# Patient Record
Sex: MALE | Race: WHITE | Employment: OTHER | URBAN - METROPOLITAN AREA
[De-identification: names, ages, dates, MRNs, and addresses within clinical notes are randomized per-mention and may not be internally consistent; named-entity substitution may affect disease eponyms.]

---

## 2017-02-24 ENCOUNTER — HOSPITAL ENCOUNTER (OUTPATIENT)
Age: 62
Setting detail: OUTPATIENT SURGERY
Discharge: HOME | End: 2017-02-24
Attending: ORTHOPAEDIC SURGERY | Admitting: ORTHOPAEDIC SURGERY
Payer: COMMERCIAL

## 2017-02-24 ENCOUNTER — ANESTHESIA EVENT (OUTPATIENT)
Dept: SURGERY | Age: 62
End: 2017-02-24
Payer: COMMERCIAL

## 2017-02-24 ENCOUNTER — ANESTHESIA (OUTPATIENT)
Dept: SURGERY | Age: 62
End: 2017-02-24
Payer: COMMERCIAL

## 2017-02-24 ENCOUNTER — APPOINTMENT (OUTPATIENT)
Dept: GENERAL RADIOLOGY | Age: 62
End: 2017-02-24
Attending: ORTHOPAEDIC SURGERY
Payer: COMMERCIAL

## 2017-02-24 VITALS
HEART RATE: 80 BPM | TEMPERATURE: 97.6 F | BODY MASS INDEX: 20.47 KG/M2 | RESPIRATION RATE: 12 BRPM | WEIGHT: 143 LBS | HEIGHT: 70 IN | SYSTOLIC BLOOD PRESSURE: 130 MMHG | OXYGEN SATURATION: 100 % | DIASTOLIC BLOOD PRESSURE: 80 MMHG

## 2017-02-24 PROBLEM — S82.851A CLOSED TRIMALLEOLAR FRACTURE OF RIGHT ANKLE: Status: ACTIVE | Noted: 2017-02-24

## 2017-02-24 PROBLEM — S93.04XA DISLOCATION OF RIGHT ANKLE JOINT: Status: ACTIVE | Noted: 2017-02-24

## 2017-02-24 PROCEDURE — 76210000021 HC REC RM PH II 0.5 TO 1 HR: Performed by: ORTHOPAEDIC SURGERY

## 2017-02-24 PROCEDURE — 77030008684 HC TU ET CUF COVD -B: Performed by: ANESTHESIOLOGY

## 2017-02-24 PROCEDURE — 74011250636 HC RX REV CODE- 250/636

## 2017-02-24 PROCEDURE — 77030028224 HC PDNG CST BSNM -A: Performed by: ORTHOPAEDIC SURGERY

## 2017-02-24 PROCEDURE — 76010000138 HC OR TIME 0.5 TO 1 HR: Performed by: ORTHOPAEDIC SURGERY

## 2017-02-24 PROCEDURE — 76000 FLUOROSCOPY <1 HR PHYS/QHP: CPT

## 2017-02-24 PROCEDURE — 76210000006 HC OR PH I REC 0.5 TO 1 HR: Performed by: ORTHOPAEDIC SURGERY

## 2017-02-24 PROCEDURE — 76060000032 HC ANESTHESIA 0.5 TO 1 HR: Performed by: ORTHOPAEDIC SURGERY

## 2017-02-24 PROCEDURE — 77030026438 HC STYL ET INTUB CARD -A: Performed by: ANESTHESIOLOGY

## 2017-02-24 RX ORDER — SODIUM CHLORIDE 0.9 % (FLUSH) 0.9 %
5-10 SYRINGE (ML) INJECTION AS NEEDED
Status: DISCONTINUED | OUTPATIENT
Start: 2017-02-24 | End: 2017-02-24 | Stop reason: HOSPADM

## 2017-02-24 RX ORDER — FENTANYL CITRATE 50 UG/ML
INJECTION, SOLUTION INTRAMUSCULAR; INTRAVENOUS AS NEEDED
Status: DISCONTINUED | OUTPATIENT
Start: 2017-02-24 | End: 2017-02-24 | Stop reason: HOSPADM

## 2017-02-24 RX ORDER — PROPOFOL 10 MG/ML
INJECTION, EMULSION INTRAVENOUS AS NEEDED
Status: DISCONTINUED | OUTPATIENT
Start: 2017-02-24 | End: 2017-02-24 | Stop reason: HOSPADM

## 2017-02-24 RX ORDER — PROMETHAZINE HYDROCHLORIDE 25 MG/1
25 TABLET ORAL
Qty: 20 TAB | Refills: 0 | Status: CANCELLED
Start: 2017-02-24

## 2017-02-24 RX ORDER — HYDROCODONE BITARTRATE AND ACETAMINOPHEN 5; 325 MG/1; MG/1
1-2 TABLET ORAL
Qty: 30 TAB | Refills: 0 | Status: CANCELLED
Start: 2017-02-24

## 2017-02-24 RX ORDER — SODIUM CHLORIDE 0.9 % (FLUSH) 0.9 %
5-10 SYRINGE (ML) INJECTION EVERY 8 HOURS
Status: DISCONTINUED | OUTPATIENT
Start: 2017-02-24 | End: 2017-02-24 | Stop reason: HOSPADM

## 2017-02-24 RX ORDER — DEXAMETHASONE SODIUM PHOSPHATE 4 MG/ML
INJECTION, SOLUTION INTRA-ARTICULAR; INTRALESIONAL; INTRAMUSCULAR; INTRAVENOUS; SOFT TISSUE AS NEEDED
Status: DISCONTINUED | OUTPATIENT
Start: 2017-02-24 | End: 2017-02-24 | Stop reason: HOSPADM

## 2017-02-24 RX ORDER — SUCCINYLCHOLINE CHLORIDE 20 MG/ML
INJECTION INTRAMUSCULAR; INTRAVENOUS AS NEEDED
Status: DISCONTINUED | OUTPATIENT
Start: 2017-02-24 | End: 2017-02-24 | Stop reason: HOSPADM

## 2017-02-24 RX ADMIN — PROPOFOL 200 MG: 10 INJECTION, EMULSION INTRAVENOUS at 14:24

## 2017-02-24 RX ADMIN — DEXAMETHASONE SODIUM PHOSPHATE 4 MG: 4 INJECTION, SOLUTION INTRA-ARTICULAR; INTRALESIONAL; INTRAMUSCULAR; INTRAVENOUS; SOFT TISSUE at 14:39

## 2017-02-24 RX ADMIN — SUCCINYLCHOLINE CHLORIDE 100 MG: 20 INJECTION INTRAMUSCULAR; INTRAVENOUS at 14:24

## 2017-02-24 RX ADMIN — FENTANYL CITRATE 100 MCG: 50 INJECTION, SOLUTION INTRAMUSCULAR; INTRAVENOUS at 14:17

## 2017-02-24 NOTE — IP AVS SNAPSHOT
[place] [facility] 
[phone] Patient: Lula Bach MRN: [id number] PQS:[id number] You are allergic to the following No active allergies Recent Documentation Height  
  
  
  
  
  
 1.778 m Emergency Contacts Name Discharge Info Relation Home Work Mobile Martha Mi CAREGIVER [3] Girlfriend [18]   [phone] Trell Guzman DISCHARGE CAREGIVER [3] Brother [24]   [phone] About your hospitalization You were admitted on:  February 24, 2017 You last received care in the:  [facility] PACU You were discharged on:  February 24, 2017 Unit phone number:  [phone] Why you were hospitalized Your primary diagnosis was:  Dislocation Of Right Ankle Joint Your diagnoses also included:  Closed Trimalleolar Fracture Of Right Ankle Providers Seen During Your Hospitalizations Provider Role Specialty Primary office phone Shea Burnett MD Attending Provider Orthopedic Surgery [phone] Your Primary Care Physician (PCP) Primary Care Physician Office Phone Office Fax OTHER, PHYS ** None ** ** None ** Follow-up Information Follow up With Details Comments Contact Info Janet Garcia MD   Patient can only remember the practice name and not the physician Current Discharge Medication List  
  
START taking these medications Dose & Instructions Dispensing Information Comments Morning Noon Evening Bedtime HYDROcodone-acetaminophen 5-325 mg per tablet Commonly known as:  Wayna Glaze Your next dose is: Today, Tomorrow Other:  _________ Dose:  1-2 Tab Take 1-2 Tabs by mouth every four (4) hours as needed for Pain. Max Daily Amount: 12 Tabs. Quantity:  30 Tab Refills:  0  
     
   
   
   
  
 promethazine 25 mg tablet Commonly known as:  PHENERGAN  
   
 Your next dose is: Today, Tomorrow Other:  _________ Dose:  25 mg Take 1 Tab by mouth every six (6) hours as needed for Nausea. Quantity:  20 Tab Refills:  0 Where to Get Your Medications Information on where to get these meds will be given to you by the nurse or doctor. ! Ask your nurse or doctor about these medications HYDROcodone-acetaminophen 5-325 mg per tablet  
 promethazine 25 mg tablet Discharge Instructions Lower Extremity Surgery Discharge Instructions Db Mishra M.D. Please take the time to review the following instructions before you leave the hospital and use them as guidelines during your recovery from surgery. If you have any questions you may contact my office at [phone]. Wound Care/Dressing Changes: 
 
Do NOT remove your dressing or get them wet. Showering/Bathing: 
 
Do NOT remove your dressing or get them wet until you are seen for your follow-up appointment. Weight Bearing/Activities: 
 
Non-weight bearing. Please do not bear any weight on your leg. You may use your toes for balance when walking with a cane, a walker, or crutches. Ice/ Elevation: 
 
Continue ice consistently for 48 hours after surgery. After 48 hours, you should ice 3 times  per day for twenty minutes at a time for the next 5 days. After 1 week from surgery, you may use ice as needed for pain. and Continue elevation  consistently for 48 hours after surgery. After 48 hours, you may use elevation as needed for pain and swelling. Apply ice for only 30-40 minutes at a time. Do not apply ice directly on to skin. Diet: 
 
You may advance to your regular diet as tolerated. Increase your clear liquid intake for the next 2-3 days. Medication: 
1.  You will be given prescriptions for pain medication, inflammation, and nausea when you are discharged from the hospital.  Please use the medication as prescribed. Pain medications may cause constipation- Colace or Milk of Magnesia may be used as needed. Other possible side effects of pain medication are dizziness, headache, nausea, vomiting, and urinary retention. Discontinue the pain medication if you develop itching, rash, shortness of breath, or difficulties swallowing. If these symptoms  become severe or arent relieved by discontinuing the medication, you should seek immediate medical attention. 2. Refills of pain medication are authorized during office hours only  (8 AM  5 PM Monday thru Friday) 3. If you were prescribed Percocet/Oxycodone, Norco/Hydrocodone or Dilaudid/Hydromophone you must have a written prescription. These medications cannot be called into the pharmacy. 4. Do not take Tylenol/Acetaminophen in addition to your pain medication as most pain medications already contain this. Do not exceed 4000mg of Tylenol/Acetaminophen per day. 5. You may resume the medication you were taking prior to surgery. Pain medication may change the effects of any antidepressant medication you may be taking. If you have any questions about possible interactions between your regular medications and the pain medication, you should consult the physician who prescribes your regular medications. Follow up appointment: 
 
Please follow up at your scheduled appointment 2 weeks from your surgery. If you do not already have an appointment please call our office at [phone] for your follow up appointment. Returning to work: 
 
Normally we will keep you out of work until you return for your first follow up appointment from surgery. If you feel comfortable returning to work sooner, please call our office. You can discuss with Dr. Jackelyn Helms if additional time off is needed at your first follow up appointment after surgery.  
 
Important Signs and Symptoms: 
 
If any of the following signs or symptoms occur, you should contact  [facility]s office. Please be advised if a problem arises which you feel requires immediate medical attention or you are unable to contact Dr. Lillian Ronquillo office you should seek immediate medical attention at the ER or other health care facility you have access to. 
 
1. A sudden increase in swelling and/or redness or warmth at the area your surgery was performed which isnt relieved by rest, ice, and elevation. 2. Oral temperature greater than 101 degrees for 12 hours or more which isnt relieved by an increase in fluid intake and taking 2 Tylenol every 4-6 hours. 3. Excessive drainage from your incisions, or drainage which hasnt stopped by 72 hours after surgery. 4. Fever, chills, shortness of breath, chest pain, nausea, vomiting or other signs and symptoms which are of concern to you. Other Instructions: 
  
 
DISCHARGE SUMMARY from your Nurse The following personal items collected during your admission are returned to you:  
Dental Appliance: Dental Appliances: None Vision: Visual Aid: Glasses Hearing Aid:   
Jewelry: Jewelry: None Clothing: Clothing: Other (comment) (street clothes) Other Valuables: Other Valuables: [place] Valuables sent to safe: Personal Items Sent to Safe: none PATIENT INSTRUCTIONS: 
 
After general anesthesia or intravenous sedation, for 24 hours or while taking prescription Narcotics: · Limit your activities · Do not drive and operate hazardous machinery · Do not make important personal or business decisions · Do  not drink alcoholic beverages · If you have not urinated within 8 hours after discharge, please contact your surgeon on call. Report the following to your surgeon: 
· Excessive pain, swelling, redness or odor of or around the surgical area · Temperature over 100.5 · Nausea and vomiting lasting longer than 4 hours or if unable to take medications · Any signs of decreased circulation or nerve impairment to extremity: change in color, persistent  numbness, tingling, coldness or increase pain · Any questions [address] Breathing deep and coughing are very important exercises to do after surgery. Deep breathing and coughing open the little air tubes and air sacks in your lungs. You take deep breaths every day. You may not even notice - it is just something you do when you sigh or yawn. It is a natural exercise you do to keep these air passages open. After surgery, take deep breaths and cough, on purpose. Coughing and deep breathing help prevent bronchitis and pneumonia after surgery. If you had chest or belly surgery, use a pillow as a \"hug chad\" and hold it tightly to your chest or belly when you cough. DIRECTIONS: 
6. Take 10 to 15 slow deep breaths every hour while awake. 7. Breathe in deeply, and hold it for 2 seconds. 8. Exhale slowly through puckered lips, like blowing up a balloon. 9. After every 4th or 5th deep breath, hug your pillow to your chest or belly and give a hard, deep cough. Yes, it will probably hurt. But doing this exercise is very important part of healing after surgery. Take your pain medicine to help you do this exercise without too much pain. IF YOU HAVE BEEN DIAGNOSED WITH SLEEP APNEA, PLEASE USE YOUR SLEEIFP APNEA DEVICE OR CPAP MACHINE WHEN YOU INTEND TO NAP AFTER TAKING PAIN MEDICATION. Ankle Pumps Ankle pumps increase the circulation of oxygenated blood to your lower extremities and decrease your risk for circulation problems such as blood clots. They also stretch the muscles, tendons and ligaments in your foot and ankle, and prevent joint contracture in the ankle and foot, especially after surgeries on the legs. It is important to do ankle pump exercises regularly after surgery because immobility increases your risk for developing a blood clot. Your doctor may also have you take an Aspirin for the next few days as well. If your doctor did not ask you to take an Aspirin, consult with him before starting Aspirin therapy on your own. Slowly point your foot forward, feeling the muscles on the top of your lower leg stretch, and hold this position for 5 seconds. Next, pull your foot back toward you as far as possible, stretching the calf muscles, and hold that position for 5 seconds. Repeat with the other foot. Perform 10 repetitions every hour while awake for both ankles if possible (down and then up with the foot once is one repetition). You should feel gentle stretching of the muscles in your lower leg when doing this exercise. If you feel pain, or your range of motion is limited, don't  Push too hard. Only go the limit your joint and muscles will let you go. If you have increasing pain, progressively worsening leg warmth or swelling, STOP the exercise and call your doctor. Below is information about the medications your doctor is prescribing after your visit: 
 
 
 
[facility] MEDICATION AND SIDE EFFECT GUIDE 
 
 The [facility] EFFECT GUIDE was provided to the PATIENT AND CARE PROVIDER. Information provided includes instruction about drug purpose and common side effects for the following medications: 
 
. Prescriptions given  To patient preoperatively. Advised to take pain meds as prescribed by physcian. Cold Therapy Instructions Your nurse will provide a cold therapy wrap based upon your surgery, need, and your doctor's orders. INSTRUCTIONS FOR USE: 
All cooling wraps produce sustained periods of intense cold. NEVER PLACE PAD ON BARE SKIN OR HAVE CONTACT WITH BARE SKIN Always Use An Insulating Barrier. Tissue injury can occur if these devices are used improperly. Follow your surgeons instructions carefully regarding the frequency, duration and breaks from cold therapy, and the total length of treatment. Check under the pad barrier every 2 hours for skin injury (see below.) SIGNS OF SKIN INJURY: 
STOP USE AND CONTACT YOUR SURGEON if any of the following occur: Increased pain, burning, increased swelling, itching, blisters, increased redness, discoloration, welts, or other change in skin condition. INDICATIONS: 
Back, Hip, Knee or Shoulder Surgery and Post-Operative Treatment; Trauma; Orthopedic Rehabilitation CONTRAINDICATIONS: 
Cold therapy should not be used by persons with Diabetes, Raynaud's or other vasospastic disease, cold hypersensititvity, or compromised local circulation. Certain medical conditions make cold-induced injury more likely. Please consult with your healthcare provider before use. Discharge Orders None Introducing [facility]! [employer] introduces [employer] patient portal. Now you can access parts of your medical record, email your doctor's office, and request medication refills online. 1. In your internet browser, go to [email or web address] 2. Click on the First Time User? Click Here link in the Sign In box. You will see the New Member Sign Up page. 3. Enter your [employer] Access Code exactly as it appears below. You will not need to use this code after youve completed the sign-up process. If you do not sign up before the expiration date, you must request a new code. · [employer] Access Code: [email or web address] Expires: 5/25/2017 12:38 PM 
 
4. Enter the last four digits of your Social Security Number (xxxx) and Date of Birth (mm/dd/yyyy) as indicated and click Submit. You will be taken to the next sign-up page. 5. Create a [employer] ID. This will be your [employer] login ID and cannot be changed, so think of one that is secure and easy to remember. 6. Create a [employer] password. You can change your password at any time. 7. Enter your Password Reset Question and Answer. This can be used at a later time if you forget your password. 8. Enter your e-mail address. You will receive e-mail notification when new information is available in [address] 9. Click Sign Up. You can now view and download portions of your medical record. 10. Click the Download Summary menu link to download a portable copy of your medical information. If you have questions, please visit the Frequently Asked Questions section of the [employer] website. Remember, [employer] is NOT to be used for urgent needs. For medical emergencies, dial 911. Now available from your iPhone and Android! General Information Please provide this summary of care documentation to your next provider. Patient Signature:  ____________________________________________________________ Date:  ____________________________________________________________  
  
Jose Zarco Signature:  ____________________________________________________________ Date:  ____________________________________________________________

## 2017-02-24 NOTE — BRIEF OP NOTE
BRIEF OPERATIVE NOTE    Date of Procedure: 2/24/2017   Preoperative Diagnosis: Right Ankle Fracture and Dislocation  Postoperative Diagnosis: Right Ankle Fracture and Dislocation    Procedure(s):  CLOSED REDUCTION RIGHT ANKLE DISLOCATION  Surgeon(s) and Role:     * Reji Scott MD - Primary       Physician Assistant: REAL Randall    Surgical Staff:  Circ-1: Adrianna Watts RN  Physician Assistant: REAL Randall  Scrub Tech-1: Christin Dc  Event Time In   Incision Start 1427   Incision Close 1441       Anesthesia: General   Estimated Blood Loss: none  Specimens: * No specimens in log *   Findings: Trimalleolar ankle fracture/dislocation   Complications: none  Implants: * No implants in log *

## 2017-02-24 NOTE — OP NOTES
[facility]   [address] [facility], [address]   OP NOTE       Name:  Ramon Marcus   MR#:  [id number]   :  1955   Account #:  [de-identified]    Surgery Date:  2017   Date of Adm:  2017       PREOPERATIVE DIAGNOSIS: Right ankle fracture dislocation. POSTOPERATIVE DIAGNOSIS: Right ankle fracture dislocation. PROCEDURES PERFORMED: Closed reduction, right ankle fracture   dislocation. POSITION: Supine. ANESTHESIA: LMA. DRAINS OR TUBES: None. INTRAOPERATIVE COMPLICATIONS: None. SURGEON: Vitaly Barreto MD    ASSISTANT SURGEON: Jeanie Smith    ESTIMATED BLOOD LOSS: None. SPECIMEN REMOVED      OPERATIVE SUMMARY: The patient is a gentleman who sustained a   recent fracture-dislocation of his right ankle on 2017, consented   to closed reduction of the ankle fracture dislocation. The benefits and   risks of the procedure explained to the patient at length in the clinic   and again in the preop holding area. DESCRIPTION OF PROCEDURE: I signed the patient's right ankle as   the appropriate site for surgery with my initials placed in such a fashion   that they would be readily visible even after prepping and draping were   performed. The patient was taken to the operative theater. Time-out was   performed with the staff in the room confirming the right ankle as   appropriate site for surgery, administered adequate anesthesia, after   which timeout was performed with staff in the room confirming right ankle   as appropriate site for surgery. Right ankle had fracture blisters   present on the medial aspect of the ankle which were intact. Intraoperative C-arm films were taken, confirming fracture dislocation   of the ankle. Closed reduction maneuver was performed.  After timeout   was performed with the staff confirming the right ankle as appropriate   site of surgery with near anatomic alignment of the ankle achieved. Nonadhering gauze was applied to the ankle along with 4 x 4's and ABD   pads to make sure the ankle was adequately padded after which cast   padding was applied along with stockinette and over padded with   multiple rolls of cast padding with posterior and sugar-tong splint then   applied with the ankle maintained in reduced position. Intraoperative C-  arm films were taken, confirming near anatomic reduction of the   fracture. AP, mortise and lateral views with appropriate alignment of   the ankle confirmed after the splint was allowed to harden. The patient tolerated the procedure without complication. Assistant surgeon REAL Simmons, present for and instrumental in   assisting with the entirety of the case.         Oskar Cerna MD      [address] / [place]   D:  02/24/2017   15:10   T:  02/24/2017   15:33   Job #:  [id number]

## 2017-02-24 NOTE — PERIOP NOTES
Patient informed of postop orders for non weight bearing on affected limb and offered training by physical therapy technician but declined as he claims has been  Using walker prior to surgery with non weight bearing on rt extremity.

## 2017-02-24 NOTE — ANESTHESIA PREPROCEDURE EVALUATION
Anesthetic History   No history of anesthetic complications            Review of Systems / Medical History  Patient summary reviewed, nursing notes reviewed and pertinent labs reviewed    Pulmonary  Within defined limits                 Neuro/Psych   Within defined limits           Cardiovascular  Within defined limits                Exercise tolerance: >4 METS     GI/Hepatic/Renal  Within defined limits              Endo/Other  Within defined limits           Other Findings              Physical Exam    Airway  Mallampati: I  TM Distance: 4 - 6 cm  Neck ROM: normal range of motion   Mouth opening: Normal     Cardiovascular    Rhythm: regular  Rate: normal         Dental    Dentition: Lower dentition intact and Upper dentition intact     Pulmonary  Breath sounds clear to auscultation               Abdominal         Other Findings            Anesthetic Plan    ASA: 1  Anesthesia type: general          Induction: Intravenous  Anesthetic plan and risks discussed with: Patient

## 2017-02-24 NOTE — DISCHARGE INSTRUCTIONS
Lower Extremity Surgery  Discharge Instructions  Sia Navarro M.D. Please take the time to review the following instructions before you leave the hospital and use them as guidelines during your recovery from surgery. If you have any questions you may contact my office at [phone]. Wound Care/Dressing Changes:    Do NOT remove your dressing or get them wet. Showering/Bathing:    Do NOT remove your dressing or get them wet until you are seen for your follow-up appointment. Weight Bearing/Activities:    Non-weight bearing. Please do not bear any weight on your leg. You may use your toes for balance when walking with a cane, a walker, or crutches. Ice/ Elevation:    Continue ice consistently for 48 hours after surgery. After 48 hours, you should ice 3 times  per day for twenty minutes at a time for the next 5 days. After 1 week from surgery, you may use ice as needed for pain. and Continue elevation  consistently for 48 hours after surgery. After 48 hours, you may use elevation as needed for pain and swelling. Apply ice for only 30-40 minutes at a time. Do not apply ice directly on to skin. Diet:    You may advance to your regular diet as tolerated. Increase your clear liquid intake for the next 2-3 days. Medication:  1. You will be given prescriptions for pain medication, inflammation, and nausea when you are discharged from the hospital.  Please use the medication as prescribed. Pain medications may cause constipation- Colace or Milk of Magnesia may be used as needed. Other possible side effects of pain medication are dizziness, headache, nausea, vomiting, and urinary retention. Discontinue the pain medication if you develop itching, rash, shortness of breath, or difficulties swallowing. If these symptoms  become severe or arent relieved by discontinuing the medication, you should seek immediate medical attention.   2. Refills of pain medication are authorized during office hours only  (8 AM - 5 PM Monday thru Friday)  3. If you were prescribed Percocet/Oxycodone, Norco/Hydrocodone or Dilaudid/Hydromophone you must have a written prescription. These medications cannot be called into the pharmacy. 4. Do not take Tylenol/Acetaminophen in addition to your pain medication as most pain medications already contain this. Do not exceed 4000mg of Tylenol/Acetaminophen per day. 5. You may resume the medication you were taking prior to surgery. Pain medication may change the effects of any antidepressant medication you may be taking. If you have any questions about possible interactions between your regular medications and the pain medication, you should consult the physician who prescribes your regular medications. Follow up appointment:    Please follow up at your scheduled appointment 2 weeks from your surgery. If you do not already have an appointment please call our office at [phone] for your follow up appointment. Returning to work:    Normally we will keep you out of work until you return for your first follow up appointment from surgery. If you feel comfortable returning to work sooner, please call our office. You can discuss with Dr. Richie Lundberg if additional time off is needed at your first follow up appointment after surgery. Important Signs and Symptoms:    If any of the following signs or symptoms occur, you should contact Dr. Jackelyn Mera office. Please be advised if a problem arises which you feel requires immediate medical attention or you are unable to contact Dr. Jackelyn Mera office you should seek immediate medical attention at the ER or other health care facility you have access to.    1. A sudden increase in swelling and/or redness or warmth at the area your surgery was performed which isnt relieved by rest, ice, and elevation.   2. Oral temperature greater than 101 degrees for 12 hours or more which isnt relieved by an increase in fluid intake and taking 2 Tylenol every 4-6 hours. 3. Excessive drainage from your incisions, or drainage which hasnt stopped by 72 hours after surgery. 4. Fever, chills, shortness of breath, chest pain, nausea, vomiting or other signs and symptoms which are of concern to you. Other Instructions:       DISCHARGE SUMMARY from your Nurse    The following personal items collected during your admission are returned to you:   Dental Appliance: Dental Appliances: None  Vision: Visual Aid: Glasses  Hearing Aid:    Jewelry: Jewelry: None  Clothing: Clothing: Other (comment) (street clothes)  Other Valuables: Other Valuables: Eyeglasses  Valuables sent to safe: Personal Items Sent to Safe: none    PATIENT INSTRUCTIONS:    After general anesthesia or intravenous sedation, for 24 hours or while taking prescription Narcotics:  · Limit your activities  · Do not drive and operate hazardous machinery  · Do not make important personal or business decisions  · Do  not drink alcoholic beverages  · If you have not urinated within 8 hours after discharge, please contact your surgeon on call. Report the following to your surgeon:  · Excessive pain, swelling, redness or odor of or around the surgical area  · Temperature over 100.5  · Nausea and vomiting lasting longer than 4 hours or if unable to take medications  · Any signs of decreased circulation or nerve impairment to extremity: change in color, persistent  numbness, tingling, coldness or increase pain  · Any questions    COUGH AND DEEP BREATHE    Breathing deep and coughing are very important exercises to do after surgery. Deep breathing and coughing open the little air tubes and air sacks in your lungs. You take deep breaths every day. You may not even notice - it is just something you do when you sigh or yawn. It is a natural exercise you do to keep these air passages open. After surgery, take deep breaths and cough, on purpose.       Coughing and deep breathing help prevent bronchitis and pneumonia after surgery. If you had chest or belly surgery, use a pillow as a \"hug chad\" and hold it tightly to your chest or belly when you cough. DIRECTIONS:  6. Take 10 to 15 slow deep breaths every hour while awake. 7. Breathe in deeply, and hold it for 2 seconds. 8. Exhale slowly through puckered lips, like blowing up a balloon. 9. After every 4th or 5th deep breath, hug your pillow to your chest or belly and give a hard, deep cough. Yes, it will probably hurt. But doing this exercise is very important part of healing after surgery. Take your pain medicine to help you do this exercise without too much pain. IF YOU HAVE BEEN DIAGNOSED WITH SLEEP APNEA, PLEASE USE YOUR SLEEIFP APNEA DEVICE OR CPAP MACHINE WHEN YOU INTEND TO NAP AFTER TAKING PAIN MEDICATION. Ankle Pumps    Ankle pumps increase the circulation of oxygenated blood to your lower extremities and decrease your risk for circulation problems such as blood clots. They also stretch the muscles, tendons and ligaments in your foot and ankle, and prevent joint contracture in the ankle and foot, especially after surgeries on the legs. It is important to do ankle pump exercises regularly after surgery because immobility increases your risk for developing a blood clot. Your doctor may also have you take an Aspirin for the next few days as well. If your doctor did not ask you to take an Aspirin, consult with him before starting Aspirin therapy on your own. Slowly point your foot forward, feeling the muscles on the top of your lower leg stretch, and hold this position for 5 seconds. Next, pull your foot back toward you as far as possible, stretching the calf muscles, and hold that position for 5 seconds. Repeat with the other foot. Perform 10 repetitions every hour while awake for both ankles if possible (down and then up with the foot once is one repetition).     You should feel gentle stretching of the muscles in your lower leg when doing this exercise. If you feel pain, or your range of motion is limited, don't  Push too hard. Only go the limit your joint and muscles will let you go. If you have increasing pain, progressively worsening leg warmth or swelling, STOP the exercise and call your doctor. Below is information about the medications your doctor is prescribing after your visit:    Other information in your discharge envelope:  []     PRESCRIPTIONS  []     PHYSICAL THERAPY PRESCRIPTION  []     APPOINTMENT CARDS  []     Regional Anesthesia Pamphlet for block or block with On-Q Catheter from Anesthesia Service  []     Medical device information sheets/pamphlets from their    []     School/work excuse note. []     /parent work excuse note. These are general instructions for a healthy lifestyle:    *  Please give a list of your current medications to your Primary Care Provider. *  Please update this list whenever your medications are discontinued, doses are      changed, or new medications (including over-the-counter products) are added. *  Please carry medication information at all times in case of emergency situations. About Smoking  No smoking / No tobacco products / Avoid exposure to second hand smoke    Surgeon General's Warning:  Quitting smoking now greatly reduces serious risk to your health. Obesity, smoking, and sedentary lifestyle greatly increases your risk for illness and disease. A healthy diet, regular physical exercise & weight monitoring are important for maintaining a healthy lifestyle. Congestive Heart Failure  You may be retaining fluid if you have a history of heart failure or if you experience any of the following symptoms:  Weight gain of 3 pounds or more overnight or 5 pounds in a week, increased swelling in our hands or feet or shortness of breath while lying flat in bed.   Please call your doctor as soon as you notice any of these symptoms; do not wait until your next office visit. Recognize signs and symptoms of STROKE:  F - face looks uneven  A - arms unable to move or move even  S - speech slurred or non-existent  T - time-call 911 as soon as signs and symptoms begin-DO NOT go         Back to bed or wait to see if you get better-TIME IS BRAIN. Warning signs of HEART ATTACK  Call 911 if you have these symptoms    · Chest discomfort. Most heart attacks involve discomfort in the center of the chest that lasts more than a few minutes, or that goes away and comes back. It can feel like uncomfortable pressure, squeezing, fullness, or pain. · Discomfort in other areas of the upper body. Symptoms can include pain or discomfort in one or both        Arms, the back, neck, jaw, or stomach. ·  Shortness of breath with or without chest discomfort. · Other signs may include breaking out in a cold sweat, nausea, or lightheadedness    Don't wait more than five minutes to call 911 - MINUTES MATTER! Fast action can save your life. Calling 911 is almost always the fastest way to get lifesaving treatment. Emergency Medical Services staff can begin treatment when they arrive - up to an hour sooner than if someone gets to the hospital by car. [facility] MEDICATION AND SIDE EFFECT GUIDE    The [employer] MEDICATION AND SIDE EFFECT GUIDE was provided to the PATIENT AND CARE PROVIDER. Information provided includes instruction about drug purpose and common side effects for the following medications:    . Prescriptions given  To patient preoperatively. Advised to take pain meds as prescribed by physcian. Cold Therapy Instructions    Your nurse will provide a cold therapy wrap based upon your surgery, need, and your doctor's orders. INSTRUCTIONS FOR USE:  All cooling wraps produce sustained periods of intense cold.       NEVER PLACE PAD ON BARE SKIN OR HAVE CONTACT WITH BARE SKIN  Always Use An Insulating Barrier. Tissue injury can occur if these devices are used improperly. Follow your surgeons instructions carefully regarding the frequency, duration and breaks from cold therapy, and the total length of treatment. Check under the pad barrier every 2 hours for skin injury (see below.)      SIGNS OF SKIN INJURY:  STOP USE AND CONTACT YOUR SURGEON if any of the following occur: Increased pain, burning, increased swelling, itching, blisters, increased redness, discoloration, welts, or other change in skin condition. INDICATIONS:  Back, Hip, Knee or Shoulder Surgery and Post-Operative Treatment; Trauma; Orthopedic Rehabilitation      CONTRAINDICATIONS:  Cold therapy should not be used by persons with Diabetes, Raynaud's or other vasospastic disease, cold hypersensititvity, or compromised local circulation. Certain medical conditions make cold-induced injury more likely. Please consult with your healthcare provider before use.

## 2017-02-24 NOTE — ADDENDUM NOTE
Addendum  created 02/24/17 1710 by Norma Moody MD    Anesthesia Event edited, Procedure Event Log accessed

## 2017-02-24 NOTE — IP AVS SNAPSHOT
Current Discharge Medication List  
  
Take these medications as needed Dose & Instructions Dispensing Information Comments Morning Noon Evening Bedtime HYDROcodone-acetaminophen 5-325 mg per tablet Commonly known as:  Shahbaz Anne Your next dose is: Today, Tomorrow Other:  ____________ Dose:  1-2 Tab Take 1-2 Tabs by mouth every four (4) hours as needed for Pain. Max Daily Amount: 12 Tabs. Quantity:  30 Tab Refills:  0  
     
   
   
   
  
 promethazine 25 mg tablet Commonly known as:  PHENERGAN Your next dose is: Today, Tomorrow Other:  ____________ Dose:  25 mg Take 1 Tab by mouth every six (6) hours as needed for Nausea. Quantity:  20 Tab Refills:  0 Where to Get Your Medications Information about where to get these medications is not yet available ! Ask your nurse or doctor about these medications HYDROcodone-acetaminophen 5-325 mg per tablet  
 promethazine 25 mg tablet

## 2017-02-24 NOTE — ANESTHESIA POSTPROCEDURE EVALUATION
Post-Anesthesia Evaluation and Assessment    Patient: Nora Machado MRN: [id number]  SSN: [id number]    YOB: 1955  Age: 57 y.o. Sex: male       Cardiovascular Function/Vital Signs  Visit Vitals    /81    Pulse 85    Temp 36.4 °C (97.5 °F)    Resp 14    Ht 5' 10\" (1.778 m)    Wt 64.9 kg (143 lb)    SpO2 99%    BMI 20.52 kg/m2       Patient is status post general anesthesia for Procedure(s):  CLOSED REDUCTION RIGHT ANKLE. Nausea/Vomiting: None    Postoperative hydration reviewed and adequate. Pain:  Pain Scale 1: Numeric (0 - 10) (02/24/17 1450)  Pain Intensity 1: 0 (02/24/17 1450)   Managed    Neurological Status:   Neuro (WDL): Exceptions to WDL (02/24/17 1450)  Neuro  Neurologic State: Drowsy; Eyes open to voice; Pharmacologically induced (comment) (02/24/17 1450)  Orientation Level: Oriented to person (02/24/17 1450)  Cognition: Decreased command following (02/24/17 1450)  Speech: Nods appropriately (02/24/17 1450)  LUE Motor Response: Purposeful (02/24/17 1450)  LLE Motor Response: Purposeful (02/24/17 1450)  RUE Motor Response: Purposeful (02/24/17 1450)  RLE Motor Response: Moderate; Purposeful (cast, elastic wrap) (02/24/17 1450)   At baseline    Mental Status and Level of Consciousness: Arousable    Pulmonary Status:   O2 Device: Room air (02/24/17 1453)   Adequate oxygenation and airway patent    Complications related to anesthesia: None    Post-anesthesia assessment completed.  No concerns    Signed By: Phylicia Goss MD     February 24, 2017

## 2017-02-24 NOTE — IP AVS SNAPSHOT
Summary of Care Report The Summary of Care report has been created to help improve care coordination. Users with access to [employer] or [address] (Web-based application) may access additional patient information including the Discharge Summary. If you are not currently a [address] user and need more information, please call the number listed below in the [place] [address] section and ask to be connected with Medical Records. Facility Information Name Address Phone [place] [address] [phone] Patient Information Patient Name Sex KISHA Henderson ([id number]) Male 1955 Discharge Information Admitting Provider Service Area Unit Gisel Escoto MD / [phone] Ronak Vasques / [phone] Discharge Provider Discharge Date/Time Discharge Disposition Destination (none) 2017 (Pending) AHR (none) Patient Language Language ENGLISH [13] Problem List as of 2017  Never Reviewed Codes Priority Class Noted - Resolved * (Principal)Dislocation of right ankle joint ICD-10-CM: H84.05OF ICD-9-CM: 837.0   2017 - Present Closed trimalleolar fracture of right ankle ICD-10-CM: L58.177Z ICD-9-CM: 824.6   2017 - Present You are allergic to the following No active allergies Current Discharge Medication List  
  
START taking these medications Dose & Instructions Dispensing Information Comments HYDROcodone-acetaminophen 5-325 mg per tablet Commonly known as:  Annamary Michelle Dose:  1-2 Tab Take 1-2 Tabs by mouth every four (4) hours as needed for Pain. Max Daily Amount: 12 Tabs. Quantity:  30 Tab Refills:  0  
   
 promethazine 25 mg tablet Commonly known as:  PHENERGAN  Dose:  25 mg  
 Take 1 Tab by mouth every six (6) hours as needed for Nausea. Quantity:  20 Tab Refills:  0 Surgery Information ID Date/Time Status Primary Surgeon All Procedures Location [id number] 2/24/2017 1400 Unposted Simon Bermudez MD CLOSED REDUCTION RIGHT ANKLE [facility] MAIN OR Follow-up Information Follow up With Details Comments Contact Info Janet Garcia MD   Patient can only remember the practice name and not the physician Discharge Instructions Lower Extremity Surgery Discharge Instructions Mark H. Colman Cockayne, M.D. Please take the time to review the following instructions before you leave the hospital and use them as guidelines during your recovery from surgery. If you have any questions you may contact my office at [phone]. Wound Care/Dressing Changes: 
 
Do NOT remove your dressing or get them wet. Showering/Bathing: 
 
Do NOT remove your dressing or get them wet until you are seen for your follow-up appointment. Weight Bearing/Activities: 
 
Non-weight bearing. Please do not bear any weight on your leg. You may use your toes for balance when walking with a cane, a walker, or crutches. Ice/ Elevation: 
 
Continue ice consistently for 48 hours after surgery. After 48 hours, you should ice 3 times  per day for twenty minutes at a time for the next 5 days. After 1 week from surgery, you may use ice as needed for pain. and Continue elevation  consistently for 48 hours after surgery. After 48 hours, you may use elevation as needed for pain and swelling. Apply ice for only 30-40 minutes at a time. Do not apply ice directly on to skin. Diet: 
 
You may advance to your regular diet as tolerated. Increase your clear liquid intake for the next 2-3 days. Medication: 
1.  You will be given prescriptions for pain medication, inflammation, and nausea when you are discharged from the hospital.  Please use the medication as prescribed. Pain medications may cause constipation- Colace or Milk of Magnesia may be used as needed. Other possible side effects of pain medication are dizziness, headache, nausea, vomiting, and urinary retention. Discontinue the pain medication if you develop itching, rash, shortness of breath, or difficulties swallowing. If these symptoms  become severe or arent relieved by discontinuing the medication, you should seek immediate medical attention. 2. Refills of pain medication are authorized during office hours only  (8 AM  5 PM Monday thru Friday) 3. If you were prescribed Percocet/Oxycodone, Norco/Hydrocodone or Dilaudid/Hydromophone you must have a written prescription. These medications cannot be called into the pharmacy. 4. Do not take Tylenol/Acetaminophen in addition to your pain medication as most pain medications already contain this. Do not exceed 4000mg of Tylenol/Acetaminophen per day. 5. You may resume the medication you were taking prior to surgery. Pain medication may change the effects of any antidepressant medication you may be taking. If you have any questions about possible interactions between your regular medications and the pain medication, you should consult the physician who prescribes your regular medications. Follow up appointment: 
 
Please follow up at your scheduled appointment 2 weeks from your surgery. If you do not already have an appointment please call our office at [phone] for your follow up appointment. Returning to work: 
 
Normally we will keep you out of work until you return for your first follow up appointment from surgery. If you feel comfortable returning to work sooner, please call our office. You can discuss with Dr. Adolfo Tarango if additional time off is needed at your first follow up appointment after surgery.  
 
Important Signs and Symptoms: 
 
If any of the following signs or symptoms occur, you should contact  [facility] office. Please be advised if a problem arises which you feel requires immediate medical attention or you are unable to contact Dr. Sonal Timmons office you should seek immediate medical attention at the ER or other health care facility you have access to. 
 
1. A sudden increase in swelling and/or redness or warmth at the area your surgery was performed which isnt relieved by rest, ice, and elevation. 2. Oral temperature greater than 101 degrees for 12 hours or more which isnt relieved by an increase in fluid intake and taking 2 Tylenol every 4-6 hours. 3. Excessive drainage from your incisions, or drainage which hasnt stopped by 72 hours after surgery. 4. Fever, chills, shortness of breath, chest pain, nausea, vomiting or other signs and symptoms which are of concern to you. Other Instructions: 
  
 
DISCHARGE SUMMARY from your Nurse The following personal items collected during your admission are returned to you:  
Dental Appliance: Dental Appliances: None Vision: Visual Aid: Glasses Hearing Aid:   
Jewelry: Jewelry: None Clothing: Clothing: Other (comment) (street clothes) Other Valuables: Other Valuables: Ch[employer] Common Valuables sent to safe: Personal Items Sent to Safe: none PATIENT INSTRUCTIONS: 
 
After general anesthesia or intravenous sedation, for 24 hours or while taking prescription Narcotics: · Limit your activities · Do not drive and operate hazardous machinery · Do not make important personal or business decisions · Do  not drink alcoholic beverages · If you have not urinated within 8 hours after discharge, please contact your surgeon on call. Report the following to your surgeon: 
· Excessive pain, swelling, redness or odor of or around the surgical area · Temperature over 100.5 · Nausea and vomiting lasting longer than 4 hours or if unable to take medications · Any signs of decreased circulation or nerve impairment to extremity: change in color, persistent  numbness, tingling, coldness or increase pain · Any questions [address] Breathing deep and coughing are very important exercises to do after surgery. Deep breathing and coughing open the little air tubes and air sacks in your lungs. You take deep breaths every day. You may not even notice - it is just something you do when you sigh or yawn. It is a natural exercise you do to keep these air passages open. After surgery, take deep breaths and cough, on purpose. Coughing and deep breathing help prevent bronchitis and pneumonia after surgery. If you had chest or belly surgery, use a pillow as a \"hug chad\" and hold it tightly to your chest or belly when you cough. DIRECTIONS: 
6. Take 10 to 15 slow deep breaths every hour while awake. 7. Breathe in deeply, and hold it for 2 seconds. 8. Exhale slowly through puckered lips, like blowing up a balloon. 9. After every 4th or 5th deep breath, hug your pillow to your chest or belly and give a hard, deep cough. Yes, it will probably hurt. But doing this exercise is very important part of healing after surgery. Take your pain medicine to help you do this exercise without too much pain. IF YOU HAVE BEEN DIAGNOSED WITH SLEEP APNEA, PLEASE USE YOUR SLEEIFP APNEA DEVICE OR CPAP MACHINE WHEN YOU INTEND TO NAP AFTER TAKING PAIN MEDICATION. Ankle Pumps Ankle pumps increase the circulation of oxygenated blood to your lower extremities and decrease your risk for circulation problems such as blood clots. They also stretch the muscles, tendons and ligaments in your foot and ankle, and prevent joint contracture in the ankle and foot, especially after surgeries on the legs. It is important to do ankle pump exercises regularly after surgery because immobility increases your risk for developing a blood clot. Your doctor may also have you take an Aspirin for the next few days as well. If your doctor did not ask you to take an Aspirin, consult with him before starting Aspirin therapy on your own. Slowly point your foot forward, feeling the muscles on the top of your lower leg stretch, and hold this position for 5 seconds. Next, pull your foot back toward you as far as possible, stretching the calf muscles, and hold that position for 5 seconds. Repeat with the other foot. Perform 10 repetitions every hour while awake for both ankles if possible (down and then up with the foot once is one repetition). You should feel gentle stretching of the muscles in your lower leg when doing this exercise. If you feel pain, or your range of motion is limited, don't  Push too hard. Only go the limit your joint and muscles will let you go. If you have increasing pain, progressively worsening leg warmth or swelling, STOP the exercise and call your doctor. Below is information about the medications your doctor is prescribing after your visit: 
 
 
 
[facility] MEDICATION AND SIDE EFFECT GUIDE 
 
 The [address] [place] [address] [place] EFFECT GUIDE was provided to the PATIENT AND CARE PROVIDER. Information provided includes instruction about drug purpose and common side effects for the following medications: 
 
. Prescriptions given  To patient preoperatively. Advised to take pain meds as prescribed by physcian. Cold Therapy Instructions Your nurse will provide a cold therapy wrap based upon your surgery, need, and your doctor's orders. INSTRUCTIONS FOR USE: 
All cooling wraps produce sustained periods of intense cold. NEVER PLACE PAD ON BARE SKIN OR HAVE CONTACT WITH BARE SKIN Always Use An Insulating Barrier. Tissue injury can occur if these devices are used improperly. Follow your surgeons instructions carefully regarding the frequency, duration and breaks from cold therapy, and the total length of treatment. Check under the pad barrier every 2 hours for skin injury (see below.) SIGNS OF SKIN INJURY: 
STOP USE AND CONTACT YOUR SURGEON if any of the following occur: Increased pain, burning, increased swelling, itching, blisters, increased redness, discoloration, welts, or other change in skin condition. INDICATIONS: 
Back, Hip, Knee or Shoulder Surgery and Post-Operative Treatment; Trauma; Orthopedic Rehabilitation CONTRAINDICATIONS: 
Cold therapy should not be used by persons with Diabetes, Raynaud's or other vasospastic disease, cold hypersensititvity, or compromised local circulation. Certain medical conditions make cold-induced injury more likely. Please consult with your healthcare provider before use. Chart Review Routing History No Routing History on File

## 2017-03-07 NOTE — PERIOP NOTES
[facility]  Ambulatory Surgery Unit  Pre-operative Instructions    Surgery/Procedure Date  3/8/17            Tentative Arrival Time 7189      1. On the day of your surgery/procedure, please report to the Ambulatory Surgery Unit Registration Desk and sign in at your designated time. The Ambulatory Surgery Unit is located in [facility] on the [facility] side of the [facility] across from the [address] [place] [address]. Please have all of your health insurance cards and a photo ID. 2. You must have someone with you to drive you home, as you should not drive a car for 24 hours following anesthesia. Please make arrangements for a responsible adult friend or family member to stay with you for at least the first 24 hours after your surgery. 3. Do not have anything to eat or drink (including water, gum, mints, coffee, juice) after midnight   3/7/17. This may not apply to medications prescribed by your physician. (Please note below the special instructions with medications to take the morning of surgery, if applicable.)    4. We recommend you do not drink any alcoholic beverages for 24 hours before and after your surgery. 5. Stop all Aspirin, non-steroidal anti-inflammatory drugs (i.e. Advil, Aleve), vitamins, and supplements as directed by your surgeon's office. **If you are currently taking Plavix, Coumadin, or other blood-thinning agents, contact your surgeon for instructions. **    6. In an effort to help prevent surgical site infection, we ask that you shower with an anti-bacterial soap (i.e. Dial or Safeguard) for 3 days prior to and on the morning of surgery, using a fresh towel after each shower. (Please begin this process with fresh bed linens.) Do not apply any lotions, powders, or deodorants after the shower on the day of your procedure. If applicable, please do not shave the operative site for 48 hours prior to surgery. 7. Wear comfortable clothes. Wear glasses instead of contacts. Do not bring any jewelry or money (other than copays or fees as instructed). Do not wear make-up, particularly mascara, the morning of your surgery. Do not wear nail polish, particularly if you are having foot /hand surgery. Wear your hair loose or down, no ponytails, buns, oleksandr pins or clips. All body piercings must be removed. 8. You should understand that if you do not follow these instructions your surgery may be cancelled. If your physical condition changes (i.e. fever, cold or flu) please contact your surgeon as soon as possible. 9. It is important that you be on time. If a situation occurs where you may be late, or if you have any questions or problems, please call [phone].    10. Your surgery time may be subject to change. You will receive a phone call the day prior to surgery to confirm your arrival time. 11. Pediatric patients: please bring a change of clothes, diapers, bottle/sippy cup, pacifier, etc.      Special Instructions:    MEDICATIONS TO TAKE THE MORNING OF SURGERY WITH A SIP OF WATER: none      I understand a pre-operative phone call will be made to verify my surgery time. In the event that I am not available, I give permission for a message to be left on my answering service and/or with another person?       Yes     (instructions given verbally during phone assessment- pt voiced understanding)     ___________________      ___________________      ________________  (Signature of Patient)          (Witness)                   (Date and Time)

## 2017-03-07 NOTE — CONSULTS
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 year old patient with known mild Hemophilia A , initially seen recently by my colleague, Dr. Kady Patel ([facility]), on this past Monday for the first time, being admitted for ankle fracture repair ( open reduction Internal fixation and possible). He has had no prior bleeding episodes and has had a previous response to dDAVP per patient history. He will require dae-operative prophylaxis with Recombinant Factor VIII as follows :    -Pre-op Goal is for 100% levels , post op for 75% and from Day 1 onwards 50% for 7 to 14 days with monitoring of PTT and Factor VIII activity levels. -Monitor Daily Hb and Hct, and clinically for bleeding. Can attempt to adjust Kogenate dose , once his factor VIII levels are back.    -Will need to consult case management about potential outpatient factor replacement eventually to shorten the duration of hospitalization, if pt is stable. I have spoken with [facility] and [facility] lab will run the labs as stat with a turnaround time of 3 hrs. Case discussed extensively with Matthew Tatum from pharmacy, Dr. Monica Hernandez and Lisbeth Garza RN. Discussed this morning with Pre- op nurse also. Call with any questions or concerns         Seen in consultation at the request of Dr. Monica Hernandez for Mild Hemophilia A  Imaging:    None to review    Labs:  PTT from Monday, March 6, 2017 was 40 secs at office. Factor VIII from that day is  pending. PTT this am is 44 secs  History:  Past Medical History:   Diagnosis Date    Hemophilia [facility]) dx 0    \"mild\" per pt, younger brother was dx and family was tested      History reviewed. No pertinent surgical history.    Prior to Admission medications    Not on File     No Known Allergies   Social History   Substance Use Topics    Smoking status: Never Smoker    Smokeless tobacco: Not on file    Alcohol use No      Family History:  Brother with Hemophilia A    Current Medications:  No current facility-administered medications for this encounter. No current outpatient prescriptions on file. Review of Systems:    Constitutional No fevers, chills, night sweats, excessive fatigue or weight loss. Allergic/Immunologic No recent allergic reactions   Eyes No significant visual difficulties. No diplopia. ENMT No problems with hearing, no sore throat, no sinus drainage. Endocrine No hot flashes or night sweats. No cold intolerance, polyuria, or polydipsia   Hematologic/Lymphatic No easy bruising or bleeding. The patient denies any tender or palpable lymph nodes   Breasts No abnormal masses of breast, nipple discharge or pain. Respiratory No dyspnea on exertion, orthopnea, chest pain, cough or hemoptysis. Cardiovascular No anginal chest pain, irregular heart beat, tachycardia, palpitations or orthopnea. Gastrointestinal No nausea, vomiting, diarrhea, constipation, cramping, dysphagia, reflux, heartburn, GI bleeding, or early satiety. No change in bowel habits. Genitourinary (M) No hematuria, dysuria, increased frequency, urgency, hesitancy or incontinence. Musculoskeletal R. Ankle pain and some bruising +_swelling or redness. No decreased range of motion. Integumentary No chronic rashes, inflammation, ulcerations, pruritus, petechiae, purpura, ecchymoses, or skin changes. Neurologic No headache, blurred vision, and no areas of focal weakness or numbness. Normal gait. No sensory problems. Psychiatric No insomnia, depression, kimmy or mood swings. No psychotropic drugs. Physical Exam:  Was unable to see the patient this morning, as he has not arrived to the hospital as yet  Constitutional Alert, cooperative, oriented. Mood and affect appropriate. Appears close to chronological age. Well nourished. Well developed. Head Normocephalic; no scars   Eyes Conjunctivae and sclerae are clear and without icterus. Pupils are reactive and equal.   ENMT Sinuses are nontender. No oral exudates, ulcers, masses, thrush or mucositis. Oropharynx clear. Tongue normal.   Neck Supple without masses or thyromegaly. No jugular venous distension. Hematologic/Lymphatic No petechiae or purpura. No tender or palpable lymph nodes in the cervical, supraclavicular, axillary or inguinal area. Respiratory Lungs are clear to auscultation without rhonchi or wheezing. Cardiovascular Regular rate and rhythm of heart without murmurs, gallops or rubs. Chest / Line Site Chest is symmetric with no chest wall deformities. Abdomen Non-tender, non-distended, no masses, ascites or hepatosplenomegaly. Good bowel sounds. No guarding or rebound tenderness. No pulsatile masses. Musculoskeletal No tenderness or swelling, normal range of motion without obvious weakness. Extremities No visible deformities, no cyanosis, clubbing or edema. Right ankle in a cast with some bruising on his R. foot. Skin No rashes, scars, or lesions suggestive of malignancy. No petechiae, purpura, or ecchymoses. No excoriations. Neurologic No sensory or motor deficits, normal cerebellar function, normal gait, cranial nerves intact. Psychiatric Alert and oriented times three. Coherent speech. Verbalizes understanding of our discussions today.

## 2017-03-07 NOTE — PERIOP NOTES
Spoke to Dr. Clovis Wright and Dr. Darrian Bojorquez. 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 Patient will be surgery admit and be followed by Hematology, Dr. Lee Arshad. Notified Dr. Jean Marie You of decision to move to Main OR tomorrow at [phone] for patient safety. Main Or/Pacu better equipped to handle this condition. Per Dr. Jean Marie You, Dr. Lee Arshad, hematology to place preop orders for Factor VIII. [phone]. Chryl Seller in pharmacy is aware of need for Factor VIII to be infused 1 hour prior to procedure. 1110 Spoke to Dr. Lee Arshad. Wants to bring patient in 4 hours prior to surgery. Stat labs PTT, Factor VIII and CBC no diff ordered for preop tomorrow to do STAT upon arrival.  Factor VIII will go to [facility] to run. Pre-op to stay in touch with [facility] lab for result. Call all results to Dr. Lee Arshad, Hematology at [phone]. Pharmacy to enter dose and admin instructions for Factor VIII to be infused 1 hour prior to incision. Pharmacy will mix med and send any special tubing if needed along with administration instructions. Hans Limon will be the contact in the pharmacy tomorrow at phone [phone]. Pharmacist Marium will stay in contact with Dr. Jean Marie You regarding admin timing as he has a case in the [facility] prior to this one. Marium will make preop nurse aware of what time Factor VIII needs to be started in Preop. 1130am  Spoke to patient and informed of arrival time of 1115. preop call done. Patient's friend will be coming with him. However his brother is his emergency contact. Patient will provide that information to the holding nurse tomorrow.

## 2017-03-07 NOTE — PERIOP NOTES
1250 pm: Spoke with Dr. Dayton Mortimer (Hematologist) and clarified all patients orders for the day of surgery. Orders are as following:    Prior to Infusion- (Upon arrival to pre-op holding): Please Draw STAT  1. CBC w/o diff  2. PTT  3. Factor VIII- need to call Dr. Dayton Mortimer with results (Do not hold surgery for results)  4. Mixing study, APTT    15 minutes after infusion is complete: Please Draw STAT  1. PTT  2. Factor VIII      1255pm: Spoke with patient and patient stated able to arrive at [facility] surgery center at 0530 am for 0815 surgery. Patient aware that we have spoken with Dr. Dayton Mortimer (Hematologist) and have a very detailed process in place prior to his surgery start. Patient advised to call main pre-op holding or OR with any additional questions. 1258pm:  Tried to reach [employer] (Pharmacist) in Pharmacy but unable to reach. Spoke with Jacinda Torres (Pharmacist) in pharmacy who will give [employer] a message to return my call at  regarding the order for patients Factor VIII infusion for tomorrow.

## 2017-03-08 ENCOUNTER — ANESTHESIA EVENT (OUTPATIENT)
Dept: SURGERY | Age: 62
DRG: 492 | End: 2017-03-08
Payer: COMMERCIAL

## 2017-03-08 ENCOUNTER — HOSPITAL ENCOUNTER (INPATIENT)
Age: 62
LOS: 9 days | Discharge: HOME | DRG: 492 | End: 2017-03-17
Attending: ORTHOPAEDIC SURGERY | Admitting: ORTHOPAEDIC SURGERY
Payer: COMMERCIAL

## 2017-03-08 ENCOUNTER — ANESTHESIA (OUTPATIENT)
Dept: SURGERY | Age: 62
DRG: 492 | End: 2017-03-08
Payer: COMMERCIAL

## 2017-03-08 ENCOUNTER — APPOINTMENT (OUTPATIENT)
Dept: GENERAL RADIOLOGY | Age: 62
DRG: 492 | End: 2017-03-08
Attending: ORTHOPAEDIC SURGERY
Payer: COMMERCIAL

## 2017-03-08 PROBLEM — S82.853A TRIMALLEOLAR FRACTURE OF ANKLE, CLOSED: Status: ACTIVE | Noted: 2017-03-08

## 2017-03-08 LAB
ANION GAP BLD CALC-SCNC: 11 MMOL/L (ref 5–15)
APTT PPP: 30.2 SEC (ref 22.1–32.5)
APTT PPP: 44 SEC (ref 22.1–32.5)
ATRIAL RATE: 78 BPM
BUN SERPL-MCNC: 16 MG/DL (ref 6–20)
BUN/CREAT SERPL: 20 (ref 12–20)
CALCIUM SERPL-MCNC: 8.6 MG/DL (ref 8.5–10.1)
CALCULATED P AXIS, ECG09: 54 DEGREES
CALCULATED R AXIS, ECG10: 52 DEGREES
CALCULATED T AXIS, ECG11: 58 DEGREES
CHLORIDE SERPL-SCNC: 106 MMOL/L (ref 97–108)
CO2 SERPL-SCNC: 25 MMOL/L (ref 21–32)
CREAT SERPL-MCNC: 0.79 MG/DL (ref 0.7–1.3)
DIAGNOSIS, 93000: NORMAL
ERYTHROCYTE [DISTWIDTH] IN BLOOD BY AUTOMATED COUNT: 13.7 % (ref 11.5–14.5)
FACT VIII ACT/NOR PPP: 10 % (ref 80–200)
FACT VIII ACT/NOR PPP: 124 % (ref 80–200)
GLUCOSE SERPL-MCNC: 94 MG/DL (ref 65–100)
HCT VFR BLD AUTO: 37 % (ref 36.6–50.3)
HGB BLD-MCNC: 12.5 G/DL (ref 12.1–17)
INR BLD: 1 (ref 0.9–1.2)
MCH RBC QN AUTO: 29.2 PG (ref 26–34)
MCHC RBC AUTO-ENTMCNC: 33.8 G/DL (ref 30–36.5)
MCV RBC AUTO: 86.4 FL (ref 80–99)
P-R INTERVAL, ECG05: 156 MS
PLATELET # BLD AUTO: 242 K/UL (ref 150–400)
POTASSIUM SERPL-SCNC: 3.8 MMOL/L (ref 3.5–5.1)
Q-T INTERVAL, ECG07: 374 MS
QRS DURATION, ECG06: 80 MS
QTC CALCULATION (BEZET), ECG08: 426 MS
RBC # BLD AUTO: 4.28 M/UL (ref 4.1–5.7)
SODIUM SERPL-SCNC: 142 MMOL/L (ref 136–145)
THERAPEUTIC RANGE,PTTT: ABNORMAL SECS (ref 58–77)
THERAPEUTIC RANGE,PTTT: NORMAL SECS (ref 58–77)
VENTRICULAR RATE, ECG03: 78 BPM
WBC # BLD AUTO: 4.7 K/UL (ref 4.1–11.1)

## 2017-03-08 PROCEDURE — C1713 ANCHOR/SCREW BN/BN,TIS/BN: HCPCS | Performed by: ORTHOPAEDIC SURGERY

## 2017-03-08 PROCEDURE — 76010000132 HC OR TIME 2.5 TO 3 HR: Performed by: ORTHOPAEDIC SURGERY

## 2017-03-08 PROCEDURE — 74011250636 HC RX REV CODE- 250/636: Performed by: ORTHOPAEDIC SURGERY

## 2017-03-08 PROCEDURE — 76210000016 HC OR PH I REC 1 TO 1.5 HR: Performed by: ORTHOPAEDIC SURGERY

## 2017-03-08 PROCEDURE — 77030021122 HC SPLNT MAT FST BSNM -A: Performed by: ORTHOPAEDIC SURGERY

## 2017-03-08 PROCEDURE — 85240 CLOT FACTOR VIII AHG 1 STAGE: CPT | Performed by: INTERNAL MEDICINE

## 2017-03-08 PROCEDURE — 74011000636 HC RX REV CODE- 636: Performed by: INTERNAL MEDICINE

## 2017-03-08 PROCEDURE — 74011250636 HC RX REV CODE- 250/636: Performed by: INTERNAL MEDICINE

## 2017-03-08 PROCEDURE — 76001 XR FLUOROSCOPY OVER 60 MINUTES: CPT

## 2017-03-08 PROCEDURE — 85027 COMPLETE CBC AUTOMATED: CPT | Performed by: INTERNAL MEDICINE

## 2017-03-08 PROCEDURE — 77030003029 HC SUT VCRL J&J -B: Performed by: ORTHOPAEDIC SURGERY

## 2017-03-08 PROCEDURE — 77030018836 HC SOL IRR NACL ICUM -A: Performed by: ORTHOPAEDIC SURGERY

## 2017-03-08 PROCEDURE — 74011250636 HC RX REV CODE- 250/636: Performed by: ANESTHESIOLOGY

## 2017-03-08 PROCEDURE — 85730 THROMBOPLASTIN TIME PARTIAL: CPT | Performed by: ANESTHESIOLOGY

## 2017-03-08 PROCEDURE — 64450 NJX AA&/STRD OTHER PN/BRANCH: CPT

## 2017-03-08 PROCEDURE — 77030020143 HC AIRWY LARYN INTUB CGAS -A: Performed by: NURSE ANESTHETIST, CERTIFIED REGISTERED

## 2017-03-08 PROCEDURE — 85610 PROTHROMBIN TIME: CPT

## 2017-03-08 PROCEDURE — 74011250637 HC RX REV CODE- 250/637: Performed by: ORTHOPAEDIC SURGERY

## 2017-03-08 PROCEDURE — 3E0T3CZ INTRODUCE REGIONAL ANESTH IN PERIPH NRV, PLEXI, PERC: ICD-10-PCS | Performed by: ANESTHESIOLOGY

## 2017-03-08 PROCEDURE — 77030013079 HC BLNKT BAIR HGGR 3M -A: Performed by: NURSE ANESTHETIST, CERTIFIED REGISTERED

## 2017-03-08 PROCEDURE — 74011000250 HC RX REV CODE- 250

## 2017-03-08 PROCEDURE — 65620000000 HC RM CCU GENERAL

## 2017-03-08 PROCEDURE — 77030018846 HC SOL IRR STRL H20 ICUM -A: Performed by: ORTHOPAEDIC SURGERY

## 2017-03-08 PROCEDURE — 74011000250 HC RX REV CODE- 250: Performed by: ORTHOPAEDIC SURGERY

## 2017-03-08 PROCEDURE — 77030000032 HC CUF TRNQT ZIMM -B: Performed by: ORTHOPAEDIC SURGERY

## 2017-03-08 PROCEDURE — 77030002986 HC SUT PROL J&J -A: Performed by: ORTHOPAEDIC SURGERY

## 2017-03-08 PROCEDURE — 74011250636 HC RX REV CODE- 250/636

## 2017-03-08 PROCEDURE — 36415 COLL VENOUS BLD VENIPUNCTURE: CPT | Performed by: INTERNAL MEDICINE

## 2017-03-08 PROCEDURE — 77030002933 HC SUT MCRYL J&J -A: Performed by: ORTHOPAEDIC SURGERY

## 2017-03-08 PROCEDURE — 76060000036 HC ANESTHESIA 2.5 TO 3 HR: Performed by: ORTHOPAEDIC SURGERY

## 2017-03-08 PROCEDURE — 74011250637 HC RX REV CODE- 250/637: Performed by: INTERNAL MEDICINE

## 2017-03-08 PROCEDURE — 3E0T33Z INTRODUCTION OF ANTI-INFLAMMATORY INTO PERIPHERAL NERVES AND PLEXI, PERCUTANEOUS APPROACH: ICD-10-PCS | Performed by: ANESTHESIOLOGY

## 2017-03-08 PROCEDURE — 77030011640 HC PAD GRND REM COVD -A: Performed by: ORTHOPAEDIC SURGERY

## 2017-03-08 PROCEDURE — 85730 THROMBOPLASTIN TIME PARTIAL: CPT | Performed by: INTERNAL MEDICINE

## 2017-03-08 PROCEDURE — 73600 X-RAY EXAM OF ANKLE: CPT

## 2017-03-08 PROCEDURE — 80048 BASIC METABOLIC PNL TOTAL CA: CPT | Performed by: ANESTHESIOLOGY

## 2017-03-08 PROCEDURE — 77030032490 HC SLV COMPR SCD KNE COVD -B: Performed by: ORTHOPAEDIC SURGERY

## 2017-03-08 PROCEDURE — 77030002916 HC SUT ETHLN J&J -A: Performed by: ORTHOPAEDIC SURGERY

## 2017-03-08 PROCEDURE — 77030018673: Performed by: ORTHOPAEDIC SURGERY

## 2017-03-08 PROCEDURE — 77030002611: Performed by: ORTHOPAEDIC SURGERY

## 2017-03-08 PROCEDURE — 77030013837 HC NERV BLK KT BBMI -B: Performed by: ANESTHESIOLOGY

## 2017-03-08 PROCEDURE — 0QSG04Z REPOSITION RIGHT TIBIA WITH INTERNAL FIXATION DEVICE, OPEN APPROACH: ICD-10-PCS | Performed by: ORTHOPAEDIC SURGERY

## 2017-03-08 PROCEDURE — 77030003914: Performed by: ORTHOPAEDIC SURGERY

## 2017-03-08 PROCEDURE — 93005 ELECTROCARDIOGRAM TRACING: CPT

## 2017-03-08 PROCEDURE — 77030020061 HC IV BLD WRMR ADMIN SET 3M -B: Performed by: NURSE ANESTHETIST, CERTIFIED REGISTERED

## 2017-03-08 DEVICE — IMPLANTABLE DEVICE: Type: IMPLANTABLE DEVICE | Site: ANKLE | Status: FUNCTIONAL

## 2017-03-08 DEVICE — SCREW BNE L14MM DIA2.7MM SM HEX HD DIA2.5MM CORT S STL ST: Type: IMPLANTABLE DEVICE | Site: ANKLE | Status: FUNCTIONAL

## 2017-03-08 DEVICE — SCREW BNE L16MM DIA2.7MM LNG CORT FT ANK S STL ST: Type: IMPLANTABLE DEVICE | Site: ANKLE | Status: FUNCTIONAL

## 2017-03-08 RX ORDER — DIPHENHYDRAMINE HYDROCHLORIDE 50 MG/ML
50 INJECTION, SOLUTION INTRAMUSCULAR; INTRAVENOUS
Status: DISCONTINUED | OUTPATIENT
Start: 2017-03-08 | End: 2017-03-13 | Stop reason: SDUPTHER

## 2017-03-08 RX ORDER — ROPIVACAINE HYDROCHLORIDE 5 MG/ML
INJECTION, SOLUTION EPIDURAL; INFILTRATION; PERINEURAL AS NEEDED
Status: DISCONTINUED | OUTPATIENT
Start: 2017-03-08 | End: 2017-03-08 | Stop reason: HOSPADM

## 2017-03-08 RX ORDER — HYDROMORPHONE HYDROCHLORIDE 1 MG/ML
.2-.5 INJECTION, SOLUTION INTRAMUSCULAR; INTRAVENOUS; SUBCUTANEOUS
Status: DISCONTINUED | OUTPATIENT
Start: 2017-03-08 | End: 2017-03-08 | Stop reason: HOSPADM

## 2017-03-08 RX ORDER — SODIUM CHLORIDE 9 MG/ML
100 INJECTION, SOLUTION INTRAVENOUS
Status: COMPLETED | OUTPATIENT
Start: 2017-03-08 | End: 2017-03-08

## 2017-03-08 RX ORDER — ONDANSETRON 2 MG/ML
4 INJECTION INTRAMUSCULAR; INTRAVENOUS AS NEEDED
Status: DISCONTINUED | OUTPATIENT
Start: 2017-03-08 | End: 2017-03-08 | Stop reason: HOSPADM

## 2017-03-08 RX ORDER — MUPIROCIN 20 MG/G
OINTMENT TOPICAL 2 TIMES DAILY
Status: DISPENSED | OUTPATIENT
Start: 2017-03-08 | End: 2017-03-13

## 2017-03-08 RX ORDER — ONDANSETRON 2 MG/ML
4 INJECTION INTRAMUSCULAR; INTRAVENOUS
Status: DISCONTINUED | OUTPATIENT
Start: 2017-03-08 | End: 2017-03-17 | Stop reason: HOSPADM

## 2017-03-08 RX ORDER — CEFAZOLIN SODIUM IN 0.9 % NACL 2 G/100 ML
2 PLASTIC BAG, INJECTION (ML) INTRAVENOUS EVERY 8 HOURS
Status: COMPLETED | OUTPATIENT
Start: 2017-03-08 | End: 2017-03-08

## 2017-03-08 RX ORDER — PROPOFOL 10 MG/ML
INJECTION, EMULSION INTRAVENOUS AS NEEDED
Status: DISCONTINUED | OUTPATIENT
Start: 2017-03-08 | End: 2017-03-08 | Stop reason: HOSPADM

## 2017-03-08 RX ORDER — LIDOCAINE HYDROCHLORIDE 20 MG/ML
INJECTION, SOLUTION EPIDURAL; INFILTRATION; INTRACAUDAL; PERINEURAL AS NEEDED
Status: DISCONTINUED | OUTPATIENT
Start: 2017-03-08 | End: 2017-03-08 | Stop reason: HOSPADM

## 2017-03-08 RX ORDER — NALOXONE HYDROCHLORIDE 0.4 MG/ML
0.4 INJECTION, SOLUTION INTRAMUSCULAR; INTRAVENOUS; SUBCUTANEOUS AS NEEDED
Status: DISCONTINUED | OUTPATIENT
Start: 2017-03-08 | End: 2017-03-17 | Stop reason: HOSPADM

## 2017-03-08 RX ORDER — ONDANSETRON 2 MG/ML
8 INJECTION INTRAMUSCULAR; INTRAVENOUS
Status: DISCONTINUED | OUTPATIENT
Start: 2017-03-08 | End: 2017-03-17 | Stop reason: HOSPADM

## 2017-03-08 RX ORDER — ALBUTEROL SULFATE 0.83 MG/ML
2.5 SOLUTION RESPIRATORY (INHALATION)
Status: DISCONTINUED | OUTPATIENT
Start: 2017-03-08 | End: 2017-03-17 | Stop reason: HOSPADM

## 2017-03-08 RX ORDER — CEFAZOLIN SODIUM IN 0.9 % NACL 2 G/100 ML
2 PLASTIC BAG, INJECTION (ML) INTRAVENOUS ONCE
Status: COMPLETED | OUTPATIENT
Start: 2017-03-08 | End: 2017-03-08

## 2017-03-08 RX ORDER — PHENYLEPHRINE HCL IN 0.9% NACL 0.4MG/10ML
SYRINGE (ML) INTRAVENOUS AS NEEDED
Status: DISCONTINUED | OUTPATIENT
Start: 2017-03-08 | End: 2017-03-08 | Stop reason: HOSPADM

## 2017-03-08 RX ORDER — EPINEPHRINE 1 MG/ML
0.3 INJECTION, SOLUTION, CONCENTRATE INTRAVENOUS
Status: DISCONTINUED | OUTPATIENT
Start: 2017-03-08 | End: 2017-03-17 | Stop reason: HOSPADM

## 2017-03-08 RX ORDER — MIDAZOLAM HYDROCHLORIDE 1 MG/ML
INJECTION, SOLUTION INTRAMUSCULAR; INTRAVENOUS AS NEEDED
Status: DISCONTINUED | OUTPATIENT
Start: 2017-03-08 | End: 2017-03-08 | Stop reason: HOSPADM

## 2017-03-08 RX ORDER — SILVER SULFADIAZINE 10 G/1000G
CREAM TOPICAL AS NEEDED
Status: DISCONTINUED | OUTPATIENT
Start: 2017-03-08 | End: 2017-03-08 | Stop reason: HOSPADM

## 2017-03-08 RX ORDER — SODIUM CHLORIDE 9 MG/ML
999 INJECTION, SOLUTION INTRAVENOUS
Status: DISCONTINUED | OUTPATIENT
Start: 2017-03-08 | End: 2017-03-17 | Stop reason: HOSPADM

## 2017-03-08 RX ORDER — FENTANYL CITRATE 50 UG/ML
25 INJECTION, SOLUTION INTRAMUSCULAR; INTRAVENOUS
Status: DISCONTINUED | OUTPATIENT
Start: 2017-03-08 | End: 2017-03-08 | Stop reason: HOSPADM

## 2017-03-08 RX ORDER — LIDOCAINE HYDROCHLORIDE 10 MG/ML
0.1 INJECTION, SOLUTION EPIDURAL; INFILTRATION; INTRACAUDAL; PERINEURAL AS NEEDED
Status: DISCONTINUED | OUTPATIENT
Start: 2017-03-08 | End: 2017-03-08 | Stop reason: HOSPADM

## 2017-03-08 RX ORDER — FENTANYL CITRATE 50 UG/ML
50 INJECTION, SOLUTION INTRAMUSCULAR; INTRAVENOUS AS NEEDED
Status: DISCONTINUED | OUTPATIENT
Start: 2017-03-08 | End: 2017-03-08 | Stop reason: HOSPADM

## 2017-03-08 RX ORDER — HYDROMORPHONE HYDROCHLORIDE 2 MG/ML
INJECTION, SOLUTION INTRAMUSCULAR; INTRAVENOUS; SUBCUTANEOUS AS NEEDED
Status: DISCONTINUED | OUTPATIENT
Start: 2017-03-08 | End: 2017-03-08 | Stop reason: HOSPADM

## 2017-03-08 RX ORDER — ONDANSETRON 2 MG/ML
INJECTION INTRAMUSCULAR; INTRAVENOUS AS NEEDED
Status: DISCONTINUED | OUTPATIENT
Start: 2017-03-08 | End: 2017-03-08 | Stop reason: HOSPADM

## 2017-03-08 RX ORDER — SODIUM CHLORIDE 0.9 % (FLUSH) 0.9 %
5-10 SYRINGE (ML) INJECTION AS NEEDED
Status: DISCONTINUED | OUTPATIENT
Start: 2017-03-08 | End: 2017-03-17 | Stop reason: HOSPADM

## 2017-03-08 RX ORDER — HYDROCORTISONE SODIUM SUCCINATE 100 MG/2ML
100 INJECTION, POWDER, FOR SOLUTION INTRAMUSCULAR; INTRAVENOUS
Status: DISCONTINUED | OUTPATIENT
Start: 2017-03-08 | End: 2017-03-17 | Stop reason: HOSPADM

## 2017-03-08 RX ORDER — FENTANYL CITRATE 50 UG/ML
INJECTION, SOLUTION INTRAMUSCULAR; INTRAVENOUS AS NEEDED
Status: DISCONTINUED | OUTPATIENT
Start: 2017-03-08 | End: 2017-03-08 | Stop reason: HOSPADM

## 2017-03-08 RX ORDER — DIPHENHYDRAMINE HYDROCHLORIDE 50 MG/ML
50 INJECTION, SOLUTION INTRAMUSCULAR; INTRAVENOUS
Status: DISCONTINUED | OUTPATIENT
Start: 2017-03-08 | End: 2017-03-17 | Stop reason: HOSPADM

## 2017-03-08 RX ORDER — HYDROMORPHONE HYDROCHLORIDE 1 MG/ML
1 INJECTION, SOLUTION INTRAMUSCULAR; INTRAVENOUS; SUBCUTANEOUS
Status: DISCONTINUED | OUTPATIENT
Start: 2017-03-08 | End: 2017-03-17 | Stop reason: HOSPADM

## 2017-03-08 RX ORDER — MIDAZOLAM HYDROCHLORIDE 1 MG/ML
1 INJECTION, SOLUTION INTRAMUSCULAR; INTRAVENOUS AS NEEDED
Status: DISCONTINUED | OUTPATIENT
Start: 2017-03-08 | End: 2017-03-08 | Stop reason: HOSPADM

## 2017-03-08 RX ORDER — SODIUM CHLORIDE 0.9 % (FLUSH) 0.9 %
5-10 SYRINGE (ML) INJECTION EVERY 8 HOURS
Status: DISCONTINUED | OUTPATIENT
Start: 2017-03-08 | End: 2017-03-17 | Stop reason: HOSPADM

## 2017-03-08 RX ORDER — DIPHENHYDRAMINE HYDROCHLORIDE 50 MG/ML
12.5 INJECTION, SOLUTION INTRAMUSCULAR; INTRAVENOUS AS NEEDED
Status: DISCONTINUED | OUTPATIENT
Start: 2017-03-08 | End: 2017-03-08 | Stop reason: HOSPADM

## 2017-03-08 RX ORDER — ACETAMINOPHEN 325 MG/1
650 TABLET ORAL
Status: COMPLETED | OUTPATIENT
Start: 2017-03-08 | End: 2017-03-08

## 2017-03-08 RX ORDER — ACETAMINOPHEN 325 MG/1
650 TABLET ORAL
Status: DISCONTINUED | OUTPATIENT
Start: 2017-03-08 | End: 2017-03-17 | Stop reason: HOSPADM

## 2017-03-08 RX ORDER — OXYCODONE HYDROCHLORIDE 5 MG/1
5-10 TABLET ORAL
Status: DISCONTINUED | OUTPATIENT
Start: 2017-03-08 | End: 2017-03-17 | Stop reason: HOSPADM

## 2017-03-08 RX ORDER — DEXAMETHASONE SODIUM PHOSPHATE 4 MG/ML
INJECTION, SOLUTION INTRA-ARTICULAR; INTRALESIONAL; INTRAMUSCULAR; INTRAVENOUS; SOFT TISSUE AS NEEDED
Status: DISCONTINUED | OUTPATIENT
Start: 2017-03-08 | End: 2017-03-08 | Stop reason: HOSPADM

## 2017-03-08 RX ORDER — SODIUM CHLORIDE, SODIUM LACTATE, POTASSIUM CHLORIDE, CALCIUM CHLORIDE 600; 310; 30; 20 MG/100ML; MG/100ML; MG/100ML; MG/100ML
25 INJECTION, SOLUTION INTRAVENOUS CONTINUOUS
Status: DISCONTINUED | OUTPATIENT
Start: 2017-03-08 | End: 2017-03-08 | Stop reason: HOSPADM

## 2017-03-08 RX ADMIN — DEXAMETHASONE SODIUM PHOSPHATE 8 MG: 4 INJECTION, SOLUTION INTRA-ARTICULAR; INTRALESIONAL; INTRAMUSCULAR; INTRAVENOUS; SOFT TISSUE at 10:03

## 2017-03-08 RX ADMIN — SODIUM CHLORIDE, SODIUM LACTATE, POTASSIUM CHLORIDE, AND CALCIUM CHLORIDE 25 ML/HR: 600; 310; 30; 20 INJECTION, SOLUTION INTRAVENOUS at 06:00

## 2017-03-08 RX ADMIN — HYDROMORPHONE HYDROCHLORIDE 0.5 MG: 2 INJECTION, SOLUTION INTRAMUSCULAR; INTRAVENOUS; SUBCUTANEOUS at 11:00

## 2017-03-08 RX ADMIN — FENTANYL CITRATE 50 MCG: 50 INJECTION, SOLUTION INTRAMUSCULAR; INTRAVENOUS at 09:44

## 2017-03-08 RX ADMIN — HYDROMORPHONE HYDROCHLORIDE 0.5 MG: 2 INJECTION, SOLUTION INTRAMUSCULAR; INTRAVENOUS; SUBCUTANEOUS at 10:31

## 2017-03-08 RX ADMIN — ACETAMINOPHEN 650 MG: 325 TABLET, FILM COATED ORAL at 14:16

## 2017-03-08 RX ADMIN — ANTIHEMOPHILIC FACTOR (RECOMBINANT) 3000 INT'L UNITS: KIT at 22:47

## 2017-03-08 RX ADMIN — CEFAZOLIN 2 G: 10 INJECTION, POWDER, FOR SOLUTION INTRAVENOUS; PARENTERAL at 14:17

## 2017-03-08 RX ADMIN — ACETAMINOPHEN 650 MG: 325 TABLET, FILM COATED ORAL at 22:44

## 2017-03-08 RX ADMIN — FENTANYL CITRATE 50 MCG: 50 INJECTION, SOLUTION INTRAMUSCULAR; INTRAVENOUS at 09:35

## 2017-03-08 RX ADMIN — PROPOFOL 150 MG: 10 INJECTION, EMULSION INTRAVENOUS at 09:44

## 2017-03-08 RX ADMIN — MUPIROCIN: 20 OINTMENT TOPICAL at 17:10

## 2017-03-08 RX ADMIN — MIDAZOLAM HYDROCHLORIDE 2 MG: 1 INJECTION, SOLUTION INTRAMUSCULAR; INTRAVENOUS at 09:24

## 2017-03-08 RX ADMIN — SODIUM CHLORIDE 100 ML/HR: 900 INJECTION, SOLUTION INTRAVENOUS at 13:20

## 2017-03-08 RX ADMIN — ONDANSETRON 4 MG: 2 INJECTION INTRAMUSCULAR; INTRAVENOUS at 11:39

## 2017-03-08 RX ADMIN — Medication 80 MCG: at 10:36

## 2017-03-08 RX ADMIN — MIDAZOLAM HYDROCHLORIDE 2 MG: 1 INJECTION, SOLUTION INTRAMUSCULAR; INTRAVENOUS at 09:21

## 2017-03-08 RX ADMIN — Medication 80 MCG: at 10:18

## 2017-03-08 RX ADMIN — Medication 120 MCG: at 11:46

## 2017-03-08 RX ADMIN — ROPIVACAINE HYDROCHLORIDE 30 ML: 5 INJECTION, SOLUTION EPIDURAL; INFILTRATION; PERINEURAL at 09:34

## 2017-03-08 RX ADMIN — CEFAZOLIN 2 G: 10 INJECTION, POWDER, FOR SOLUTION INTRAVENOUS; PARENTERAL at 09:49

## 2017-03-08 RX ADMIN — ANTIHEMOPHILIC FACTOR (RECOMBINANT) 3000 INT'L UNITS: KIT at 07:38

## 2017-03-08 RX ADMIN — CEFAZOLIN 2 G: 10 INJECTION, POWDER, FOR SOLUTION INTRAVENOUS; PARENTERAL at 22:04

## 2017-03-08 RX ADMIN — Medication 120 MCG: at 09:50

## 2017-03-08 RX ADMIN — Medication 120 MCG: at 10:42

## 2017-03-08 RX ADMIN — Medication 120 MCG: at 10:30

## 2017-03-08 RX ADMIN — Medication 10 ML: at 22:04

## 2017-03-08 RX ADMIN — HYDROMORPHONE HYDROCHLORIDE 0.5 MG: 2 INJECTION, SOLUTION INTRAMUSCULAR; INTRAVENOUS; SUBCUTANEOUS at 11:59

## 2017-03-08 RX ADMIN — LIDOCAINE HYDROCHLORIDE 60 MG: 20 INJECTION, SOLUTION EPIDURAL; INFILTRATION; INTRACAUDAL; PERINEURAL at 09:44

## 2017-03-08 RX ADMIN — SODIUM CHLORIDE, SODIUM LACTATE, POTASSIUM CHLORIDE, AND CALCIUM CHLORIDE: 600; 310; 30; 20 INJECTION, SOLUTION INTRAVENOUS at 11:30

## 2017-03-08 NOTE — OP NOTES
DATE: 3/8/17    Preoperative Diagnosis:  Right lower extremity:  1. Trimalleolar equivalent Ankle fracture      Postoperative Diagnosis:  Same    Procedure:  1. 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 This is an unstable injury pattern. The patient is otherwise healthy and active. To avoid long term risks of posttraumatic arthritis, malunion or nonunion, I have recommended surgical fixation compared to nonsurgical treatment, although this was discussed. Risks and benefits of surgery were discussed with patient. These risks included, but were not limited to, bleeding, wound healing delay, infection, nerve injury, nonunion, malunion, persistent pain, blood clots, development of arthritis and need for further surgery. The patient understands these risks and wishes to proceed. Due to his hemophilia, he is at higher risk for bleeding. He has been seen by hematology in preparation and received preoperative recombinant Factor VIII with normalization of PTT times preoperatively. He will require postoperative monitoring and further factor replacement to avoid bleeding complications. Procedure: The patient arrived at the surgical center. Consent was obtained for the surgical procedure, as well as the Anesthetic. The correct surgical limb was identified and marked by the patient and myself. The anesthesiologist placed a peripheral nerve block for postoperative pain control. The patient was then brought to the Operating suite, where a time-out was performed which identified the patient's identity, the surgical site, and the surgical procedure.      The patient was prepped and draped in standard, sterile fashion. Once completely draped, another time-out was performed identifying all pertinent information. A thigh tourniquet was inflated to 250 mmHg. The fibula was identified and a longitudinal incision was made off the posterolateral border of the fibula. Sharp dissection was carried down to the fibula with close attention paid to the superficial peroneal nerve. The nerve was retracted anteriorly within the anterior flap. Near the distal fibula, care was taken to not extend the incision past the distal tip of the fibula. The sural nerve was protected in the posterior flap. The fracture was identified. Hematoma was debrided from the fracture. Sequential use of point to point and lobster claw reduction clamps were used to gain direct cortical contact between the proximal and distal fracture fragments. C-arm was then brought in to confirm anatomic reduction, looking at fibular length, rotation and reestablishment of the radiographic tibiofibular Shenton's line and fracture apposition. Provisional K-wires were placed to further stabilize the reduction. A 2.7 lag screw was then placed in the posterior to anterior direction. Another lag screw was placed more proximally. A 1/3 tubular plate was chosen to neutralize the fracture. The plate was applied in standard fashion. A third lag screw was then placed. The K-wires were then removed. Attention was then turned to the posterior malleolar fracture, this appeared to involve almost no joint component and was nearly anatomically reduced. The Talus was perfectly aligned under the tibial mortis without any hint of posterior subluxation. Therefore, the posterior malleolar fracture was not fixed with hardware.  Furthermore, increased dissection in that area would put the patient at higher risk for bleeding, so the risks were further weighed and no fixation was deemed necessary.      The medial joint was nicely closed down with restoration of fibular anatomic alignment and thus deltoid ligament manipulation was not performed. An intraoperative stress examination was performed (Cotton test), which demonstrated stability at the syndesmosis. Thus, the placement of syndesmotic screws was not performed. The thigh tourniquet was deflated and hemostasis was achieved. The wounds were irrigated and closed in a layered fashion. A sterile dressing of xeroform, 4x4s and sterile webril was applied. The prior fracture blisters were debrided of loose, nonviable tissue and silvadene with a nonstick adaptic dressing was applied. Drapes were then removed and a plaster splint was applied.       Plan:  - Weightbearing: elevate foot and non-weight bearing operative lower extremity  - Keep dressing clean and dry  - Elevate extremity just above level of heart  - Pain control  - DVT prophylaxis: ambulation; contraindicated due to hemophilia  - Dispo: admit for monitoring and Factor VIII

## 2017-03-08 NOTE — PROGRESS NOTES
1400 - 1430  TRANSFER - IN REPORT:    Verbal report received from JO ANN Colvin(name) on [employer]  being received from [employer]) for routine post - op      Report consisted of patients Situation, Background, Assessment and   Recommendations(SBAR). Information from the following report(s) SBAR, Kardex, OR Summary, Intake/Output, MAR, Accordion, Med Rec Status and Cardiac Rhythm sinus was reviewed with the receiving nurse. Opportunity for questions and clarification was provided. Assessment completed upon patients arrival to unit and care assumed. Primary Nurse Sejal Cuevas RN and JO ANN Spears performed a dual skin assessment on this patient No impairment noted  Hammad score is 21    Patient assessment completed and charted. Patient alert and oriented, pulses intact, right lower extremity warm, cap refill, dressing dry and intact, VSS. Patient c/o headache, prn pain medication administered, will reassess. 1500 Patient resting w/o complaints of pain or discomfort at current time. 1700 Reassessment completed. No new changes.

## 2017-03-08 NOTE — PERIOP NOTES
TRANSFER - OUT REPORT:    Verbal report given to Qiana Dominguez RN(name) on [employer]  being transferred to CCU/2550(unit) for routine post - op       Report consisted of patients Situation, Background, Assessment and   Recommendations(SBAR). Information from the following report(s) SBAR, OR Summary, Intake/Output, MAR and Cardiac Rhythm NSR was reviewed with the receiving nurse. Opportunity for questions and clarification was provided. Patient transported with:   Monitor  Registered Nurse  Tech     Friend in [facility] notified of transfer.

## 2017-03-08 NOTE — ANESTHESIA PROCEDURE NOTES
Peripheral Block    Start time: 3/8/2017 9:23 AM  End time: 3/8/2017 9:39 AM  Performed by: Christin Gutierrez by: Alvin Trotter: Indications: at surgeon's request and post-op pain management    Preanesthetic Checklist: patient identified, risks and benefits discussed, site marked, timeout performed, anesthesia consent given and patient being monitored    Timeout Time: 09:20          Block Type:   Block Type:  Popliteal  Laterality:  Right  Monitoring:  Standard ASA monitoring, frequent vital sign checks, responsive to questions and oxygen  Injection Technique:  Single shot  Procedures: ultrasound guided and nerve stimulator    Patient Position: prone  Prep: chlorhexidine    Location:  Upper thigh  Needle Type:  Stimuplex  Needle Gauge:  22 G  Needle Localization:  Nerve stimulator and ultrasound guidance  Motor Response: minimal motor response >0.4 mA    Medication Injected:  0.5%  ropivacaine  Adds:  Epi 1:200K (4 mg Dexamethasone)  Volume (mL):  30    Assessment:  Number of attempts:  1  Injection Assessment:  Incremental injection every 5 mL, no paresthesia, no intravascular symptoms, negative aspiration for blood, local visualized surrounding nerve on ultrasound and ultrasound image on chart  Patient tolerance:  Patient tolerated the procedure well with no immediate complications      Patient tolerated procedure well. No pain, paresthesia, or blood noted. VSS throughout. No complications noted.

## 2017-03-08 NOTE — H&P
HPI   Chief complaint:  64-year-old male with right ankle pain     History of present illness:  64-year-old male with right ankle pain after injuring it. He went to [facility] originally was found to have a ankle fracture dislocation. His talus is posterior displaced. He was placed into a splint without reduction and sent out for follow-up. He was seen by Dr. Constantin Harrell who performed a closed reduction and splinting in the operating room with plans to have him definitively fixed by 1 of his foot and ankle partners. This is where he has come to me for treatment. He reports that his pain is currently 2/10. It was 3/10 at its worst.  Standing makes it worse. Icing and elevating makes it better. He does report he has a history of hemophilia A. He does not have a hematologist however. He denies dizziness, lightheadedness, other injuries, other signs of bleeding. No numbness or tingling is noted. Review of systems:  Positive per HPI, otherwise negative. Smoking status:  Nonsmoker     Physical examination:  NAD, A&O x3  Trachea midline  NRRR  No audible wheezes  Abdomen NT/nondistended     Right Lower Extremity:  Alignment:  Normal alignment with mild swelling  Tenderness to palpation:  Tender palpation lateral and medial ankle  Moderate fracture blisters that are hemorrhagic in nature both medially and laterally     SILT DP/SP/MP/LP/Sural/Saphenous  +EHL/FHL  Palpable DP  CR brisk        Imaging: Three views of the right ankle still in the splint demonstrated maintenance of reduction of trimalleolar equivalent ankle fracture     Splint removed. New post casting x-rays were obtained. These were nonweightbearing. Demonstrated near anatomic reduction of tibiotalar joint. Two views of the right tib-fib demonstrated no proximal fractures dislocations. Distal fractures are redemonstrated.            A/P:  Right trimalleolar equivalent ankle fracture in a hemophiliac male  - patient requires urgent follow-up with hematologist.  May require preoperative optimization such as vasopressin treatment or factor treatment prior to undergoing surgery. Surgery would be underneath protection of tourniquet, although postoperative bleeding may be present. - patient still requires swelling to diminish and fracture blisters to improve. He should be ready surgically by the middle of next week if he aggressively elevate his leg and continues to decrease his swelling.  - plan for surgical fixation after Hematology sees him and once swelling subsides and soft tissues allow incision placement     - SURGICAL CONSENTING:  The patient was counseled on operative and non operative management. risks of surgical treatment include but are not limited to bleeding, infection, wound healing delay, nerve injury resulting in numbness or pain, need for revision surgery, blood clots and need for ampuation.   - If hardware is used, then failure of hardware is a risk. - Malunion or nonunion are risks for surgery requiring bone healing (fracture fixation) or fusion (arthritis or deformity correction)  - The patient understands these risks and wishes to proceed. Procedure note:  Short-leg cast applied to maintain reduction until definitive surgery could be performed. The cast was placed from the toes to the knee. It was well-padded and bivalved to allow for swelling. Patient tolerated the procedure well. Amended : Lucas Hall MD; 03/03/2017 8:39 PM EST. Current Meds   Promethazine HCl - 25 MG Oral Tablet;TAKE 1 TABLET EVERY 6 TO 8 HOURS AS NEEDED NAUSEA.; Rx. Allergies   No Known Drug Allergies. Active Problems   Dislocation of ankle, right, closed   (S93.04XA). PMH   No known problems (Z78.9). PSH   None reported. Family Hx   No pertinent family history: Mother,Father. Personal Hx   Living alone (Z60.2)  Never smoked.   Vital Signs    Recorded by Evette Rodriguez on 03 Mar 2017 01:21 PM  BP:153/93,   Height: 5 ft 10 in, Weight: 143 lb , BMI: 20.5 kg/m2,   BMI Calculated: 20.52 ,   BSA Calculated: 1.81 ,   Pulse BPM: 106    Recorded by CarmenJuanito on 23 Feb 2017 03:04 PM  Height: 5 ft 10.00 in, Weight: 143 lb 0.00 oz, BMI: 20.5 kg/m2,   BMI Calculated: 20.52 ,   BSA Calculated: 1.81. Signature   Electronically signed by : Myrna Desai MD; 03/03/2017 8:06 PM EST; Author. Electronically signed by : Myrna Dseai MD; 03/03/2017 8:39 PM EST; Author. Date of Surgery Update:  Cherry Tolbert was seen and examined. This is a planned second and necessary step in treatment of his right ankle fracture/dislocation. History and physical has been reviewed. The patient has been examined. There have been no significant clinical changes since the completion of the originally dated History and Physical.  Patient identified by surgeon; surgical site was confirmed by patient and surgeon. Seen by hematologist this morning as well. Beginning Factor VIII replacement for his hemophilia. He will require 7 days of infusion with close monitoring for adverse reaction, including anaphylaxis. Splint removed; improved blistering medially; nearly resolved. Small lateral hemorrhagic blister which is away from proposed incision site. Plan to proceed with ORIF trimalleolar equivalent right ankle fracture, dislocation. Signed By: Nahid Carpio MD     March 8, 2017 7:38 AM

## 2017-03-08 NOTE — BRIEF OP NOTE
BRIEF OPERATIVE NOTE    Date of Procedure: 3/8/2017   Preoperative Diagnosis: RIGHT ANKLE FRACTURE  Postoperative Diagnosis: RIGHT ANKLE FRACTURE    Procedure(s):  ORIF OF RIGHT FIBULA FRACTURE   Surgeon(s) and Role:     * Sunny Arrington MD - Primary            Surgical Staff:  Circ-1: Epifanio Higuera, JO ANN  Circ-Relief: Demetrius Alford RN  Radiology Technician: RT Boubacar  Scrub Tech-1: Joe Shrestha  Scrub Tech-Relief: Lynne Saucedo  Surg Asst-1: Leti Martin McPhipps  Surg Asst-Relief: Ronda Coyne, JO ANN  Float Staff: Johnny Wills RN  Event Time In   Incision Start 1014   Incision Close 1211     Anesthesia: General   Estimated Blood Loss: < 10 cc  Specimens: * No specimens in log *   Findings: see full note   Complications: none  Implants:   Implant Name Type Inv.  Item Serial No.  Lot No. LRB No. Used Action   SCR BNE [address]ILL ST SM 2.7X16MM SS --  - SNA  SCR BNE GILL ST SM 2.7X16MM SS --  NA [employer] NA Right 2 Implanted   PLATE BNE 0-2SS TBLR 10H 121MM --  - SNA  PLATE BNE 9-6AY TBLR 10H 121MM --  NA [employer] NA Right 1 Implanted   3.5mm x 10mm CORTICAL SCREW   NA [employer] NA Right 3 Implanted   4.0mm x 16 mm CANCELLOUS (FULL THREAD)   NA [employer] NA Right 1 Implanted   3.5x14mm CORTICAL SCREW   N/A [employer] N/A Right 1 Implanted   SCR BNE GILL ST SM 2.7X14MM SS --  - SN/A  SCR BNE GILL ST SM 2.7X14MM SS --  N/A [employer] N/A Right 1 Implanted   3.5 X 12MM CORTICAL SCREW     N/A   N/A Right 1 Implanted

## 2017-03-08 NOTE — PERIOP NOTES
2774  Time out performed by Dr. Matthew Camargo and Carla French, JO ANN prior to right popliteal nerve block, patient placed on monitors, and given 4 milligrams IV versed for procedure, tolerated procedure without difficulty

## 2017-03-08 NOTE — IP AVS SNAPSHOT
Current Discharge Medication List  
  
START taking these medications Dose & Instructions Dispensing Information Comments Morning Noon Evening Bedtime  
 acetaminophen 325 mg tablet Commonly known as:  TYLENOL Your last dose was: Your next dose is:    
   
   
 Dose:  650 mg Take 2 Tabs by mouth every six (6) hours for 14 days. Quantity:  112 Tab Refills:  0  
     
   
   
   
  
 oxyCODONE IR 5 mg immediate release tablet Commonly known as:  Thomas Marroquin Your last dose was: Your next dose is:    
   
   
 Dose:  5-10 mg Take 1-2 Tabs by mouth every four (4) hours as needed. Max Daily Amount: 60 mg.  
 Quantity:  30 Tab Refills:  0  
     
   
   
   
  
 polyethylene glycol 17 gram/dose powder Commonly known as:  Jerry Buck Your last dose was: Your next dose is:    
   
   
 Dose:  17 g Take 17 g by mouth daily for 15 days. Quantity:  255 g Refills:  0  
     
   
   
   
  
 senna-docusate 8.6-50 mg per tablet Commonly known as:  SENNA PLUS Your last dose was: Your next dose is:    
   
   
 Dose:  1 Tab Take 1 Tab by mouth two (2) times a day. Take until having regular bowel movements. Quantity:  60 Tab Refills:  0 Where to Get Your Medications Information on where to get these meds will be given to you by the nurse or doctor. ! Ask your nurse or doctor about these medications  
  acetaminophen 325 mg tablet  
 oxyCODONE IR 5 mg immediate release tablet  
 polyethylene glycol 17 gram/dose powder  
 senna-docusate 8.6-50 mg per tablet

## 2017-03-08 NOTE — IP AVS SNAPSHOT
[address] [facility] 
[phone] Patient: Humberto Mendez MRN: [id number] GDW:[id number] You are allergic to the following No active allergies Recent Documentation Height Weight BMI Smoking Status 1.778 m 66.6 kg 21.07 kg/m2 Never Smoker Emergency Contacts Name Discharge Info Relation Home Work Mobile Gianluca Rodriguez CAREGIVER [3] Girlfriend [18]   [phone] Trell Guzman DISCHARGE CAREGIVER [3] Brother [24]   [phone] About your hospitalization You were admitted on:  March 8, 2017 You last received care in the:  [facility] You were discharged on:  March 17, 2017 Unit phone number:  [phone] Why you were hospitalized Your primary diagnosis was:  Not on File Your diagnoses also included:  Trimalleolar Fracture Of Ankle, Closed Providers Seen During Your Hospitalizations Provider Role Specialty Primary office phone Sunny Galvin MD Attending Provider Orthopedic Surgery [phone] Your Primary Care Physician (PCP) Primary Care Physician Office Phone Office Fax OTHER, PHYS ** None ** ** None ** Follow-up Information Follow up With Details Comments Contact Info Colette Linaers MD On 3/20/2017  [address] [address] 
[phone] Sunny Galvin MD In 1 week  [address] [place] [address] [address] [facility] 
[phone] Current Discharge Medication List  
  
START taking these medications Dose & Instructions Dispensing Information Comments Morning Noon Evening Bedtime  
 acetaminophen 325 mg tablet Commonly known as:  TYLENOL Your last dose was: Your next dose is:    
   
   
 Dose:  650 mg Take 2 Tabs by mouth every six (6) hours for 14 days. Quantity:  112 Tab Refills:  0 oxyCODONE IR 5 mg immediate release tablet Commonly known as:  Juan Diego Trell Your last dose was: Your next dose is:    
   
   
 Dose:  5-10 mg Take 1-2 Tabs by mouth every four (4) hours as needed. Max Daily Amount: 60 mg.  
 Quantity:  30 Tab Refills:  0  
     
   
   
   
  
 polyethylene glycol 17 gram/dose powder Commonly known as:  Desirae Mt Your last dose was: Your next dose is:    
   
   
 Dose:  17 g Take 17 g by mouth daily for 15 days. Quantity:  255 g Refills:  0  
     
   
   
   
  
 senna-docusate 8.6-50 mg per tablet Commonly known as:  SENNA PLUS Your last dose was: Your next dose is:    
   
   
 Dose:  1 Tab Take 1 Tab by mouth two (2) times a day. Take until having regular bowel movements. Quantity:  60 Tab Refills:  0 Where to Get Your Medications Information on where to get these meds will be given to you by the nurse or doctor. ! Ask your nurse or doctor about these medications  
  acetaminophen 325 mg tablet  
 oxyCODONE IR 5 mg immediate release tablet  
 polyethylene glycol 17 gram/dose powder  
 senna-docusate 8.6-50 mg per tablet Discharge Instructions [address] [facility] Channing Alexander MD 
Postoperative Instructions Right/Left Lower Extremity: 
 _X__Non Weight Bearing    ___Postop Shoe 
 ___Heel touch weightbearing               ___CAM Boot 
 ___Weightbearing as tolerated   _X__Splint/Cast 
 
Dressing: 
 __X_Keep Dressing or Splint clean & dry 
 _X__Do Not remove dressing; Dressing will be changed at first postoperative visit. Showering: ? You may shower 48 hours after your surgery. Do Not allow dressing or splint to get wet! Diet:  
? Advance diet as tolerated. You may experience nausea due to anesthesia, pain or pain medications. You should avoid spicy or heavy meals initially. Pain Management: ? If you have received a block, the pain relief can last from 4-24 hours. This also means you may not have sensation or movement in your foot for the same amount of time. ? You will have pain after the block wears off. Anticipate this and start pain medications prior to the block wearing off. 
? Do Not drive while taking narcotic pain medications. Elevation: 
? Strict elevation at or just above the level of your heart for the first 14 days after surgery. Limit time that foot is in dependent position for trips to bathroom and kitchen as much as possible. Early control of swelling will improve future swelling. Ice:  
? __X___ Lico Thompson may ice your surgical extremity for no longer than 20 minutes at a time, 3-4 times per day. Do Not apply ice directly to the skin. ? _____ Do Not apply ice to surgical extremity. Aspirin therapy:  
? Please DO NOT take any NSAIDs (Ibuprofen, Advil, Motrin, or Aleve) Call our office at [phone] if the following occur: ? Severe swelling, profuse bleeding or severe pain which does not improve with pain medication. ? Fever greater than 101.0; fevers less than this are very common in the first few days after surgery and are unlikely to indicate infection. Follow up: Next week- call the office to find out your appointment time. Discharge Orders None Zoomph Announcement We are excited to announce that we are making your provider's discharge notes available to you in Zoomph. You will see these notes when they are completed and signed by the physician that discharged you from your recent hospital stay. If you have any questions or concerns about any information you see in Zoomph, please call the Health Information Department where you were seen or reach out to your Primary Care Provider for more information about your plan of care. Introducing [facility]!    
 [address] introduces Zoomph patient portal. Now you can access parts of your medical record, email your doctor's office, and request medication refills online. 1. In your internet browser, go to [email or web address] 2. Click on the First Time User? Click Here link in the Sign In box. You will see the New Member Sign Up page. 3. Enter your 1EQ Access Code exactly as it appears below. You will not need to use this code after youve completed the sign-up process. If you do not sign up before the expiration date, you must request a new code. · 1EQ Access Code: [email or web address] Expires: 5/25/2017  1:38 PM 
 
4. Enter the last four digits of your Social Security Number (xxxx) and Date of Birth (mm/dd/yyyy) as indicated and click Submit. You will be taken to the next sign-up page. 5. Create a 1EQ ID. This will be your 1EQ login ID and cannot be changed, so think of one that is secure and easy to remember. 6. Create a 1EQ password. You can change your password at any time. 7. Enter your Password Reset Question and Answer. This can be used at a later time if you forget your password. 8. Enter your e-mail address. You will receive e-mail notification when new information is available in [address] 9. Click Sign Up. You can now view and download portions of your medical record. 10. Click the Download Summary menu link to download a portable copy of your medical information. If you have questions, please visit the Frequently Asked Questions section of the 1EQ website. Remember, 1EQ is NOT to be used for urgent needs. For medical emergencies, dial 911. Now available from your iPhone and Android! General Information Please provide this summary of care documentation to your next provider. Patient Signature:  ____________________________________________________________ Date:  ____________________________________________________________  
  
Vera Feller  Provider Signature: ____________________________________________________________ Date:  ____________________________________________________________

## 2017-03-08 NOTE — PERIOP NOTES
8238 -Spoke to Henrique Chow in lab about patient labs making sure that they have all labs needed before we start any medication pushes with patients. Per Henrique Chow with lab these labs go to [facility], she stated yes they have all the labs they needed, they have the blue top tube. Made Henrique Chow aware that we also sent a light green tube. She said yes. 6962 - Looked at notes and realized also sent a CBC and called back spoke to [facility] in lab and ask to make sure they in fact did have the blue top tube, cbc lavender top tube and the chem 8 light green top tube and that is all that is needed for this patient. Evelyn stated that she would ask to make sure. Henrique Chow comes back on phone and states that they cannot say if that is all that is needed for this patient but they have all the labs that we have sent. Notified primary nurse Latha.

## 2017-03-08 NOTE — ANESTHESIA POSTPROCEDURE EVALUATION
Post-Anesthesia Evaluation and Assessment    Patient: Naomi Zhong MRN: [id number]  SSN: [id number]    YOB: 1955  Age: 57 y.o. Sex: male       Cardiovascular Function/Vital Signs  Visit Vitals    /68    Pulse 88    Temp 36.4 °C (97.6 °F)    Resp 12    Ht 5' 10\" (1.778 m)    Wt 64.9 kg (143 lb 1.3 oz)    SpO2 100%    BMI 20.53 kg/m2       Patient is status post general anesthesia for Procedure(s):  ORIF OF RIGHT FIBULA FRACTURE . Nausea/Vomiting: None    Postoperative hydration reviewed and adequate. Pain:  Pain Scale 1: Numeric (0 - 10) (03/08/17 1240)  Pain Intensity 1: 0 (03/08/17 1240)   Managed    Neurological Status:   Neuro (WDL): Exceptions to WDL (S/P Popliteal block RLE) (03/08/17 1240)  Neuro  Neurologic State: Drowsy; Eyes open spontaneously; Eyes open to voice (03/08/17 1240)  Orientation Level: Oriented to person;Oriented to place;Oriented to situation (03/08/17 1240)  Cognition: Appropriate for age attention/concentration; Follows commands (03/08/17 1240)  Speech: Delayed responses; Appropriate for age;Clear (03/08/17 1240)  LUE Motor Response: Purposeful (03/08/17 1240)  LLE Motor Response: Purposeful (03/08/17 1240)  RUE Motor Response: Purposeful (03/08/17 1240)  RLE Motor Response: Numbness; Purposeful (Upper leg WDL/ Popliteal block w/o movement-sensation toes) (03/08/17 1240)   At baseline    Mental Status and Level of Consciousness: Arousable    Pulmonary Status:   O2 Device: Nasal cannula (03/08/17 1300)   Adequate oxygenation and airway patent    Complications related to anesthesia: None    Post-anesthesia assessment completed.  No concerns    Signed By: Imer Nina MD     March 8, 2017

## 2017-03-08 NOTE — PERIOP NOTES
Handoff Report from Operating Room to PACU    Report received from LITA Truong RN and Gume Lynch CRNA regarding Abeba Lundberg. Surgeon(s):  Medynes. Jayme Rivas MD  And Procedure(s) (LRB):  ORIF OF RIGHT FIBULA FRACTURE  (Right)  confirmed   with allergies and dressings discussed. Anesthesia type, drugs, patient history, complications, estimated blood loss, vital signs, intake and output, and last pain medication, lines, temperature and regional block were reviewed.

## 2017-03-08 NOTE — ANESTHESIA PREPROCEDURE EVALUATION
Anesthetic History   No history of anesthetic complications            Review of Systems / Medical History  Patient summary reviewed, nursing notes reviewed and pertinent labs reviewed    Pulmonary  Within defined limits                 Neuro/Psych   Within defined limits           Cardiovascular  Within defined limits                Exercise tolerance: >4 METS     GI/Hepatic/Renal  Within defined limits              Endo/Other  Within defined limits           Other Findings   Comments: Hemophilia           Physical Exam    Airway  Mallampati: II  TM Distance: 4 - 6 cm  Neck ROM: normal range of motion   Mouth opening: Normal     Cardiovascular  Regular rate and rhythm,  S1 and S2 normal,  no murmur, click, rub, or gallop             Dental  No notable dental hx       Pulmonary  Breath sounds clear to auscultation               Abdominal  GI exam deferred       Other Findings            Anesthetic Plan    ASA: 2  Anesthesia type: general    Monitoring Plan: BIS  Post-op pain plan if not by surgeon: peripheral nerve block single    Induction: Intravenous  Anesthetic plan and risks discussed with: Patient

## 2017-03-08 NOTE — PERIOP NOTES
Patient brought back to preop holding and case tracked at 0546. Patient able to undress and use CHG wipes without assistance. 20G IV inserted 0555, Blue and purple top labs completed upon insertion of IV. Labels printed and applied and taken to lab by unit Tech. Once completed in system, green top lab appeared for PST. On right arm butterfly needle inserted for lab draw. Label printed and taken to lab by unit Tech.    0700 Dr. Noah Taylor notified of all lab work completed and time line for infusion. Dr. Noah Taylor would like clarification from Dr. Peace Brito concerning holding up surgery and block until lab results are back.  Rohan Brito concerning Dr. Lynn Tellez concerns. Dr. Peace Brito states that once infusion is completed and stat PTT has been drawn 15 minutes after completion of infusion, once stat PTT is available for review and Dr. Noah Taylor is okay with lab surgery can continue without Factor VIII results. Dr. Peaec Brito states that we do not need to contact her with Factor VIII results, she will have access to results. 1385 Dr. Peace Brito at bedside to assess patient. 0725 Mixed Kogenate FS per instructions. 4393 Insertion of filtered needle with medication inserted Left AC, secured and injection started. 9588 DR. Queen at bedside. 0745 Patient tolerating  Injection well, on continuous cardiac monitoring. 0756 Injection completed. Filter needle removed and pressure held at site.     9648 Patient in [facility], prone position, cardiac monitoring     0815 JO ANN Maynard at bedside stat lab draw for Factor VIII, ISTAT INR 1.0  Blue top tube labeled and walked to lab by Joann Andre, [address] [place] [address] Drive with Hannah in lab, she will call with results.   6471 Lab called need to have another blue top tube drawn on patient  0848 Lab work has been walked to lab by Diego White S Adarsh Bo Updated patient's friend in waiting room

## 2017-03-09 LAB
APTT PPP: 26.6 SEC (ref 22.1–32.5)
FACT VIII ACT/NOR PPP: 111 % (ref 80–200)
HCT VFR BLD AUTO: 33.6 % (ref 36.6–50.3)
HGB BLD-MCNC: 11.3 G/DL (ref 12.1–17)
THERAPEUTIC RANGE,PTTT: NORMAL SECS (ref 58–77)

## 2017-03-09 PROCEDURE — G8978 MOBILITY CURRENT STATUS: HCPCS

## 2017-03-09 PROCEDURE — 36415 COLL VENOUS BLD VENIPUNCTURE: CPT | Performed by: ORTHOPAEDIC SURGERY

## 2017-03-09 PROCEDURE — 97116 GAIT TRAINING THERAPY: CPT

## 2017-03-09 PROCEDURE — 74011250637 HC RX REV CODE- 250/637: Performed by: ORTHOPAEDIC SURGERY

## 2017-03-09 PROCEDURE — 85291 CLOT FACTOR XIII FIBRIN SCRN: CPT | Performed by: INTERNAL MEDICINE

## 2017-03-09 PROCEDURE — 97161 PT EVAL LOW COMPLEX 20 MIN: CPT

## 2017-03-09 PROCEDURE — 65270000029 HC RM PRIVATE

## 2017-03-09 PROCEDURE — G8979 MOBILITY GOAL STATUS: HCPCS

## 2017-03-09 PROCEDURE — 85730 THROMBOPLASTIN TIME PARTIAL: CPT | Performed by: ORTHOPAEDIC SURGERY

## 2017-03-09 PROCEDURE — 85018 HEMOGLOBIN: CPT | Performed by: ORTHOPAEDIC SURGERY

## 2017-03-09 PROCEDURE — 74011000636 HC RX REV CODE- 636: Performed by: INTERNAL MEDICINE

## 2017-03-09 PROCEDURE — 85240 CLOT FACTOR VIII AHG 1 STAGE: CPT | Performed by: ORTHOPAEDIC SURGERY

## 2017-03-09 RX ADMIN — OXYCODONE HYDROCHLORIDE 10 MG: 5 TABLET ORAL at 23:11

## 2017-03-09 RX ADMIN — OXYCODONE HYDROCHLORIDE 5 MG: 5 TABLET ORAL at 14:30

## 2017-03-09 RX ADMIN — OXYCODONE HYDROCHLORIDE 5 MG: 5 TABLET ORAL at 10:04

## 2017-03-09 RX ADMIN — ANTIHEMOPHILIC FACTOR (RECOMBINANT) 500 INT'L UNITS: KIT at 22:41

## 2017-03-09 RX ADMIN — OXYCODONE HYDROCHLORIDE 10 MG: 5 TABLET ORAL at 18:36

## 2017-03-09 RX ADMIN — ACETAMINOPHEN 650 MG: 325 TABLET, FILM COATED ORAL at 07:59

## 2017-03-09 RX ADMIN — Medication 10 ML: at 23:06

## 2017-03-09 RX ADMIN — ANTIHEMOPHILIC FACTOR (RECOMBINANT) 1000 INT'L UNITS: KIT at 22:42

## 2017-03-09 RX ADMIN — MUPIROCIN: 20 OINTMENT TOPICAL at 09:08

## 2017-03-09 RX ADMIN — Medication 10 ML: at 05:26

## 2017-03-09 RX ADMIN — Medication 10 ML: at 14:30

## 2017-03-09 RX ADMIN — MUPIROCIN: 20 OINTMENT TOPICAL at 18:36

## 2017-03-09 RX ADMIN — ANTIHEMOPHILIC FACTOR (RECOMBINANT) 1000 INT'L UNITS: KIT at 10:02

## 2017-03-09 RX ADMIN — ANTIHEMOPHILIC FACTOR (RECOMBINANT) 500 INT'L UNITS: KIT at 10:02

## 2017-03-09 NOTE — PROGRESS NOTES
Pt is a 58 yo male admitted from home for revision of R ankle fracture. Pt had a closed reduction/spliting for stability and was referred to foot/ankle specialist - Dr. Iqra Urbina for final fixation procedure. 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 Pt was referred to a Hematologist at [address] [place] [address] prior to surgery, Dr. Melissa Huff and she continues to follow. Pt underwent ORIF on 3/8/17. Hematologist is recommending that pt continue with Factor VIII infusions for 1 week post-operatively - at home after discharge if it is possible to arrange, otherwise pt may need to complete Factor VIII in hospital.    Pt lives alone in a 2-story home and was completely independent for mobility and ADLs pta. His girlfriend, Spencer Walton has been assisting him since his inury and he will likely go to her home in [place] ([facility], [address] [facility]). He has a RW, crutches, shower chair and an Iwalk (peg leg knee brace). CM first sent refs to [employer] - they are unable to provide this service. Refs were then sent to [facility] and [facility] unable to accept. Kassy researching and will get back to CM. Dr. Taya Murrieta had a contact at [facility] so CM spoke with Hilary Simmons [phone]), who advised that [facility], if approves Factor VIII, will not ship medication until pt is discharged. This would involve mail order so pt would probably have 24-36 hours without medication. Message left w/ Dr. Jerry Brasher office indicating that script will be needed to send to [facility]. CM also spoke w/ [facility] to inquire if they could administer Factor VIII. After researching, CM was told that they could administer but again, meds could not be shipped until pt is discharged. Care Management Interventions  PCP Verified by CM: Yes  Mode of Transport at Discharge: Other (see comment) (Girlfriend)  Transition of Care Consult (CM Consult):  Other (Possible home infusion)  Discharge Durable Medical Equipment: No  Physical Therapy Consult: Yes  Occupational Therapy Consult: Yes  Current Support Network: Lives Alone (Will stay w/ GF at [employer] in [place])  Confirm Follow Up Transport: Friends  Plan discussed with Pt/Family/Caregiver: Yes  Freedom of Choice Offered: Yes  Discharge Location  Discharge Placement: Home with infusion therapy     RAHUL Wilcox

## 2017-03-09 NOTE — PROGRESS NOTES
Attended Interdisciplinary rounds in Critical Care Unit, where patient care was discussed. Visit by: Jodie Alfaro. Mayur Lee.  Esperanza Kim MA, [address]

## 2017-03-09 NOTE — PROGRESS NOTES
S: Patient doing very well. No pain at all. No cp or sob.     O:  Visit Vitals    /69    Pulse 77    Temp 98.2 °F (36.8 °C)    Resp 15    Ht 5' 10\" (1.778 m)    Wt 66.6 kg (146 lb 13.2 oz)    SpO2 98%    BMI 21.07 kg/m2       right lower extremity:  Splint C/D/I; no evidence of bleeding    Toes: pink with brisk cap refill    No EHL/FHL 2/2 block    No sensation dorsum and plantar toes 2/2 block      A/P: POD 1 s/p orif right ankle, hemophilia  - continue factor VIII per heme/onc  - transfer to [facility] with tele  - pt/ot  - nwb rle  - pain control

## 2017-03-09 NOTE — PROGRESS NOTES
Problem: Mobility Impaired (Adult and Pediatric)  Goal: *Acute Goals and Plan of Care (Insert Text)  Physical Therapy Goals  Initiated 3/9/2017  1. Patient will move from supine to sit and sit to supine , scoot up and down and roll side to side in bed with independence within 7 day(s). 2. Patient will transfer from bed to chair and chair to bed with modified independence using the least restrictive device within 7 day(s). 3. Patient will perform sit to stand with modified independence within 7 day(s). 4. Patient will ambulate with modified independence for 150 feet with the least restrictive device within 7 day(s). 5. Patient will ascend/descend 4 stairs with 2 handrail(s) with modified independence within 7 day(s). PHYSICAL THERAPY EVALUATION  Patient: Nora Machado (87 y.o. male)  Date: 3/9/2017  Primary Diagnosis: RIGHT ANKLE FRACTURE  Trimalleolar fracture of ankle, closed  Procedure(s) (LRB):  ORIF OF RIGHT FIBULA FRACTURE  (Right) 1 Day Post-Op   Precautions:  Fall, NWB      ASSESSMENT :  Based on the objective data described below, the patient presents with impaired standing balance, RLE NWB precautions, impaired gait, decreased activity tolerance, increased RLE pain, decreased independence, and decline from baseline functional mobility POD#1 R fibular ORIF. Pt is independent for mobility at baseline and lives alone, however will be staying with friend. Pt received supine in bed and agreeable to PT evaluation. Pt cleared by nursing for mobility. VSS. Pt performed bed mobility with supervision and transfers with CGA, requiring educated regarding safe use of RW and hand placement during transfers. Pt ambulated 95' with CGA x 1 using RW, demonstrating hop-to gait pattern, decreased step length and clearance on LLE, and fluctuations in gait speed. -130s bpm during gait. Pt was assisted into bedside chair following ambulation with RLE elevated. Pt was left with all needs met and nursing present. Anticipate pt will continue to progress well in acute PT and return to friend's home with HHPT and friend support. Patient will continue to benefit from skilled acute PT to improve gait and mobility. Patient will benefit from skilled intervention to address the above impairments. Patients rehabilitation potential is considered to be Good  Factors which may influence rehabilitation potential include:   [X]         None noted  [ ]         Mental ability/status  [ ]         Medical condition  [ ]         Home/family situation and support systems  [ ]         Safety awareness  [ ]         Pain tolerance/management  [ ]         Other:        PLAN :  Recommendations and Planned Interventions:  [X]           Bed Mobility Training             [ ]    Neuromuscular Re-Education  [X]           Transfer Training                   [ ]    Orthotic/Prosthetic Training  [X]           Gait Training                         [ ]    Modalities  [X]           Therapeutic Exercises           [ ]    Edema Management/Control  [X]           Therapeutic Activities            [X]    Patient and Family Training/Education  [X]           Other (comment): stair training     Frequency/Duration: Patient will be followed by physical therapy  6 times a week to address goals. Discharge Recommendations: Home Health and To Be Determined  Further Equipment Recommendations for Discharge: TBD based on progress in acute PT       SUBJECTIVE:   Patient stated I prefer crutches.       OBJECTIVE DATA SUMMARY:   HISTORY:    Past Medical History:   Diagnosis Date    Hemophilia ([facility] [address] dx 0     \"mild\" per pt, younger brother was dx and family was tested     Past Surgical History:   Procedure Laterality Date    HX ORTHOPAEDIC         right ankle procedure to reset dislocation     Prior Level of Function/Home Situation: Pt is independent for mobility at baseline; drives; self-employed; pt with R ankle dislocation with fx ~ 2 weeks ago and has been using crutches and iwalk; plans to discharge home to friend's house who will provide support at discharge  Personal factors and/or comorbidities impacting plan of care: hemophilia     Home Situation  Home Environment: Private residence  # Steps to Enter: 4  Rails to Enter: Yes  Hand Rails : Bilateral  Wheelchair Ramp: No  One/Two Story Residence: Two story, live on 1st floor  Living Alone: No  Support Systems: Friends \ neighbors, Family member(s)  Patient Expects to be Discharged to[de-identified] Unknown  Current DME Used/Available at Home: Crutches, Shower chair, Walker, rolling (iwalk)  Tub or Shower Type: Shower     EXAMINATION/PRESENTATION/DECISION MAKING:   Critical Behavior:  Neurologic State: Alert  Orientation Level: Oriented X4  Cognition: Follows commands     Skin:  RLE cast  Strength:    Strength: Within functional limits                    Tone & Sensation:   Tone: Normal              Sensation: Intact               Range Of Motion:  AROM: Within functional limits           PROM: Within functional limits           Coordination:  Coordination: Within functional limits     Functional Mobility:  Bed Mobility:  Rolling: Supervision  Supine to Sit: Supervision     Scooting: Supervision  Transfers:  Sit to Stand: Contact guard assistance  Stand to Sit: Contact guard assistance                       Balance:   Sitting: Intact  Standing: Impaired; With support  Standing - Static: Good  Standing - Dynamic : Fair  Ambulation/Gait Training:  Distance (ft): 95 Feet (ft)  Assistive Device: Gait belt;Walker, rolling  Ambulation - Level of Assistance: Contact guard assistance;Assist x1;Additional time; Adaptive equipment     Gait Description (WDL): Exceptions to WDL  Gait Abnormalities: Decreased step clearance (hop-to gait pattern)  Right Side Weight Bearing: Non-weight bearing  Left Side Weight Bearing: Full  Base of Support: Narrowed     Speed/Selina: Pace decreased (<100 feet/min)  Step Length: Left shortened     Functional Measure:  Barthel Index:  Bathin  Bladder: 10  Bowels: 10  Groomin  Dressing: 10  Feeding: 10  Mobility: 10  Stairs: 5  Toilet Use: 5  Transfer (Bed to Chair and Back): 10  Total: 75         Barthel and G-code impairment scale:  Percentage of impairment CH  0% CI  1-19% CJ  20-39% CK  40-59% CL  60-79% CM  80-99% CN  100%   Barthel Score 0-100 100 99-80 79-60 59-40 20-39 1-19    0   Barthel Score 0-20 20 17-19 13-16 9-12 5-8 1-4 0      The Barthel ADL Index: Guidelines  1. The index should be used as a record of what a patient does, not as a record of what a patient could do. 2. The main aim is to establish degree of independence from any help, physical or verbal, however minor and for whatever reason. 3. The need for supervision renders the patient not independent. 4. A patient's performance should be established using the best available evidence. Asking the patient, friends/relatives and nurses are the usual sources, but direct observation and common sense are also important. However direct testing is not needed. 5. Usually the patient's performance over the preceding 24-48 hours is important, but occasionally longer periods will be relevant. 6. Middle categories imply that the patient supplies over 50 per cent of the effort. 7. Use of aids to be independent is allowed. Shanna Leonardo., Barthel, D.W. (7422). Functional evaluation: the Barthel Index. [address] [facility] (14)2. CHASE BoltonMALEE, Jaime Kelly., Daniel Pelayo., [place], [address] [place] [address] (). Measuring the change indisability after inpatient rehabilitation; comparison of the responsiveness of the Barthel Index and Functional Critz Measure. Journal of Neurology, Neurosurgery, and Psychiatry, 66(4), 426-542. Kwon Arms, N.J.A, PRATIMA Rodríguez, & Tejal Kemp M.A. (2004.) Assessment of post-stroke quality of life in cost-effectiveness studies: The usefulness of the Barthel Index and the EuroQoL-5D.  Quality of Life Research, 13, 427-43         G codes: In compliance with CMSs Claims Based Outcome Reporting, the following G-code set was chosen for this patient based on their primary functional limitation being treated: The outcome measure chosen to determine the severity of the functional limitation was the Barthel Index with a score of 75/100 which was correlated with the impairment scale.       · Mobility - Walking and Moving Around:               - CURRENT STATUS:    CJ - 20%-39% impaired, limited or restricted               - GOAL STATUS:           CI - 1%-19% impaired, limited or restricted               - D/C STATUS:                       ---------------To be determined---------------      Physical Therapy Evaluation Charge Determination   History Examination Presentation Decision-Making   MEDIUM  Complexity : 1-2 comorbidities / personal factors will impact the outcome/ POC  HIGH Complexity : 4+ Standardized tests and measures addressing body structure, function, activity limitation and / or participation in recreation  LOW Complexity : Stable, uncomplicated  Other outcome measures Barthel Index LOW       Based on the above components, the patient evaluation is determined to be of the following complexity level: LOW      Pain:  Pain Scale 1: Numeric (0 - 10)  Pain Intensity 1: 0  Pain Location 1: Leg     Pain Description 1: Aching  Pain Intervention(s) 1: Medication (see MAR)  Activity Tolerance:   Good/fair - pt with 2/10 RLE pain; HR 130s during gait  BP supine: 128/74  BP sittin/97  BP standin/108  BP sitting post-activity: 153/80  After treatment:   [X]         Patient left in no apparent distress sitting up in chair  [ ]         Patient left in no apparent distress in bed  [X]         Call bell left within reach  [X]         Nursing notified  [X]         Caregiver present  [ ]         Bed alarm activated      COMMUNICATION/EDUCATION:   The patients plan of care was discussed with: Physical Therapist and Registered Nurse.  [X]         Fall prevention education was provided and the patient/caregiver indicated understanding. [X]         Patient/family have participated as able in goal setting and plan of care. [X]         Patient/family agree to work toward stated goals and plan of care. [ ]         Patient understands intent and goals of therapy, but is neutral about his/her participation. [ ]         Patient is unable to participate in goal setting and plan of care.      Thank you for this referral.  Mattie Liang, PT, DPT   Time Calculation: 20 mins

## 2017-03-09 NOTE — PROGRESS NOTES
Report received from Citlali Ly RN via [employer] and [employer]. Received in bed. Awake and oriented. Right leg below knee cast dry and intact. + pedal pulse, foot warm , wiggle toes freely and + capillaary refill. 1300 Up with PT (see note). Appetite good. Voiding clear yellow urine. 14:09 TRANSFER - OUT REPORT:    Verbal report given to Malkalashaejoi Dickson on Delsa Dakins  being transferred to [address] [place] [address] for routine progression of care       Report consisted of patients Situation, Background, Assessment and   Recommendations(SBAR). Information from the following report(s) SBAR, Procedure Summary, Intake/Output, MAR, Recent Results and Med Rec Status was reviewed with the receiving nurse. Lines:   Peripheral IV 03/08/17 Right Hand (Active)   Site Assessment Clean, dry, & intact 3/9/2017 12:00 PM   Phlebitis Assessment 0 3/9/2017 12:00 PM   Infiltration Assessment 0 3/9/2017 12:00 PM   Dressing Status Clean, dry, & intact 3/9/2017 12:00 PM   Dressing Type Bacteriocidal 3/9/2017 12:00 PM   Hub Color/Line Status Pink;Capped 3/9/2017 12:00 PM   Alcohol Cap Used Yes 3/9/2017 12:00 PM        Opportunity for questions and clarification was provided. Patient transported with:   Registered Nurse and monitor          C/o right leg discomfort. 5/10 Medicated.

## 2017-03-09 NOTE — ROUTINE PROCESS
7:00 PM  Report received from Kendrick Truong, [address] [place] [address].     7:45 PM  Assessment completed. Pt A&OX4, VSS, R foot is elevated, pt does not complain of pain at this time and states his toes are \"still numb\". Will continue to monitor. 8:48 PM  Pt does not complain of any pain, toes are still numb. Cap refill < 3 seconds. R leg is elevated. Will continue to monitor. 10:48 PM  Factor VIII given. Pt tolerated well. Tylenol given for pt HA. VSS, will continue to monitor. 11:15 PM  Reassessment completed, no changes noted. Will continue to monitor. 3:15 AM  Reassessment completed, No changes noted. Per pt, toes remain numb. Cap refill remains< 3 seconds. VSS. Will continue to monitor. 7:21 AM  Reassessment completed. No changes noted. Pt still states his toes are numb. Cap refill < 3 seconds. Transfer orders placed for pt.     9:59 AM  Lab notes mistake for order drawing XIII lab when should be VIII lab, mistake has been corrected and [facility] said they will fix the label. 10:06 AM  Pt complains of 5/10 pain, Oxycodone given. Pt is now able to move his toes and feeling is starting to come back. 10:15 AM  Factor VIII given to patient, patient tolerated well. 11:02 AM  Report given to [facility], RN.

## 2017-03-09 NOTE — PROGRESS NOTES
3/9/2017    INTENSIVIST PROGRESS NOTE:     Patient seen and evaluated. Admitted for ORIF of right ankle fracture. He has hemophilia A and is admitted to the ICU for observation for bleeding. He currently has no complaints.   Uneventful night  No distress    Visit Vitals    /77    Pulse 78    Temp 97.6 °F (36.4 °C)    Resp (!) 6    Ht 5' 10\" (1.778 m)    Wt 66.6 kg (146 lb 13.2 oz)    SpO2 99%    BMI 21.07 kg/m2     GEN: NAD  CV: RRR  Lungs: CTA B    Labs reviewed    A/P:  - s/p ORIF of right ankle fracture - continue postop care  - Hemophilia A - no evidence of bleeding - factor replacement per hematology  - Will assist with disposition planning, transfer to ortho floor today  Corey Ritter MD

## 2017-03-09 NOTE — PROGRESS NOTES
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 Has done well so far. No evidence of bleeding. Will continue to monitor H/H and clinically. Appropriate response to Kogenate( 10 to 124%). From here on our goal will be about 50% levels for the next week. Plan:  He will require post-operative prophylaxis with Recombinant Factor VIII as follows :    -Pre-op Goal is for 100% levels , post op for 75% and from Day 1 onwards 50% for 7 to 14 days with monitoring of PTT     -Monitor Daily Hb and Hct, and clinically for bleeding.     -Consider moving out of ICU    -Appreciate case management consult about potential outpatient factor replacement eventually to shorten the duration of hospitalization, if pt is stable. Am available for questions to assist with disposition.    -Following closely with you    -Case d/w Dr. Emory Arrington. Imaging:    Reviewed    Labs:    Hb 11.3, PTT normalized. Last Factor VIII was 124 %.     Past Medical History:   Diagnosis Date    Hemophilia [facility]) dx 0    \"mild\" per pt, younger brother was dx and family was tested      Past Surgical History:   Procedure Laterality Date    HX ORTHOPAEDIC      right ankle procedure to reset dislocation      Prior to Admission medications    Not on File     No Known Allergies   Social History   Substance Use Topics    Smoking status: Never Smoker    Smokeless tobacco: Not on file    Alcohol use No      Family History:  Brother with Hemophilia A    Current Medications:  Current Facility-Administered Medications   Medication Dose Route Frequency    diphenhydrAMINE (BENADRYL) injection 50 mg  50 mg IntraVENous ONCE PRN    meperidine (DEMEROL) injection 25 mg  25 mg IntraVENous ONCE PRN    ondansetron (ZOFRAN) injection 8 mg  8 mg IntraVENous ONCE PRN    0.9% sodium chloride infusion  999 mL/hr IntraVENous ONCE PRN    EPINEPHrine HCl (PF) (ADRENALIN) 1 mg/mL (1 mL) injection 0.3 mg  0.3 mg SubCUTAneous ONCE PRN    hydrocortisone Sod Succ (PF) (SOLU-CORTEF) injection 100 mg  100 mg IntraVENous ONCE PRN    diphenhydrAMINE (BENADRYL) injection 50 mg  50 mg IntraVENous ONCE PRN    albuterol (PROVENTIL VENTOLIN) nebulizer solution 2.5 mg  2.5 mg Nebulization ONCE PRN    Factor 8 Potential Adverse Reactions  1 Each Other DAILY    sodium chloride (NS) flush 5-10 mL  5-10 mL IntraVENous Q8H    sodium chloride (NS) flush 5-10 mL  5-10 mL IntraVENous PRN    acetaminophen (TYLENOL) tablet 650 mg  650 mg Oral Q4H PRN    ondansetron (ZOFRAN) injection 4 mg  4 mg IntraVENous Q4H PRN    oxyCODONE IR (ROXICODONE) tablet 5-10 mg  5-10 mg Oral Q4H PRN    HYDROmorphone (PF) (DILAUDID) injection 1 mg  1 mg IntraVENous Q4H PRN    naloxone (NARCAN) injection 0.4 mg  0.4 mg IntraVENous PRN    mupirocin (BACTROBAN) 2 % ointment   Both Nostrils BID    factor VIII (plasma/albumin free) (ADVATE) injection 1,000 Int'l Units  1,000 Int'l Units IntraVENous BID    And    factor VIII (plasma/albumin free) (ADVATE) injection 500 Int'l Units  500 Int'l Units IntraVENous BID         Review of Systems:    Constitutional No fevers, chills, night sweats, excessive fatigue or weight loss. Allergic/Immunologic No recent allergic reactions   Eyes No significant visual difficulties. No diplopia. ENMT No problems with hearing, no sore throat, no sinus drainage. Endocrine No hot flashes or night sweats. No cold intolerance, polyuria, or polydipsia   Hematologic/Lymphatic No easy bruising or bleeding. The patient denies any tender or palpable lymph nodes   Breasts No abnormal masses of breast, nipple discharge or pain. Respiratory No dyspnea on exertion, orthopnea, chest pain, cough or hemoptysis.    Cardiovascular No anginal chest pain, irregular heart beat, tachycardia, palpitations or orthopnea. Gastrointestinal No nausea, vomiting, diarrhea, constipation, cramping, dysphagia, reflux, heartburn, GI bleeding, or early satiety. No change in bowel habits. Genitourinary (M) No hematuria, dysuria, increased frequency, urgency, hesitancy or incontinence. Musculoskeletal R. Ankle pain and some bruising +_swelling or redness. No decreased range of motion. Integumentary No chronic rashes, inflammation, ulcerations, pruritus, petechiae, purpura, ecchymoses, or skin changes. Neurologic No headache, blurred vision, and no areas of focal weakness or numbness. Normal gait. No sensory problems. Psychiatric No insomnia, depression, kimmy or mood swings. No psychotropic drugs. Physical Exam:    Constitutional Alert, cooperative, oriented. Mood and affect appropriate. Appears close to chronological age. Well nourished. Well developed. Head Normocephalic; no scars   Eyes Conjunctivae and sclerae are clear and without icterus. Pupils are reactive and equal.   ENMT Sinuses are nontender. No oral exudates, ulcers, masses, thrush or mucositis. Oropharynx clear. Tongue normal.   Neck Supple without masses or thyromegaly. No jugular venous distension. Hematologic/Lymphatic No petechiae or purpura. No tender or palpable lymph nodes in the cervical, supraclavicular, axillary or inguinal area. Respiratory Lungs are clear to auscultation without rhonchi or wheezing. Cardiovascular Regular rate and rhythm of heart without murmurs, gallops or rubs. Chest / Line Site Chest is symmetric with no chest wall deformities. Abdomen Non-tender, non-distended, no masses, ascites or hepatosplenomegaly. Good bowel sounds. No guarding or rebound tenderness. No pulsatile masses. Musculoskeletal No tenderness or swelling, normal range of motion without obvious weakness.    Extremities No visible deformities, no cyanosis, clubbing or edema. Right ankle in a cast    Skin No rashes, scars, or lesions suggestive of malignancy. No petechiae, purpura, or ecchymoses. No excoriations. Neurologic No sensory or motor deficits, normal cerebellar function, normal gait, cranial nerves intact. Psychiatric Alert and oriented times three. Coherent speech. Verbalizes understanding of our discussions today.

## 2017-03-09 NOTE — PROGRESS NOTES
PM note - no obvious bleeding at present. Surgery went well. This service will continue to follow. Continue FVIII as ordered. Reviewed history with patient. His brother had more significant disease complicated by factor borne viral illness and actually got a liver transplant.   Rei Perez MD FACP

## 2017-03-10 LAB
APTT PPP: 31.3 SEC (ref 22.1–32.5)
BASOPHILS # BLD AUTO: 0 K/UL (ref 0–0.1)
BASOPHILS # BLD: 0 % (ref 0–1)
EOSINOPHIL # BLD: 0.1 K/UL (ref 0–0.4)
EOSINOPHIL NFR BLD: 2 % (ref 0–7)
ERYTHROCYTE [DISTWIDTH] IN BLOOD BY AUTOMATED COUNT: 13.8 % (ref 11.5–14.5)
HCT VFR BLD AUTO: 36 % (ref 36.6–50.3)
HGB BLD-MCNC: 11.8 G/DL (ref 12.1–17)
LYMPHOCYTES # BLD AUTO: 26 % (ref 12–49)
LYMPHOCYTES # BLD: 1.4 K/UL (ref 0.8–3.5)
MCH RBC QN AUTO: 28.9 PG (ref 26–34)
MCHC RBC AUTO-ENTMCNC: 32.8 G/DL (ref 30–36.5)
MCV RBC AUTO: 88.2 FL (ref 80–99)
MONOCYTES # BLD: 0.4 K/UL (ref 0–1)
MONOCYTES NFR BLD AUTO: 8 % (ref 5–13)
NEUTS SEG # BLD: 3.5 K/UL (ref 1.8–8)
NEUTS SEG NFR BLD AUTO: 64 % (ref 32–75)
PLATELET # BLD AUTO: 243 K/UL (ref 150–400)
RBC # BLD AUTO: 4.08 M/UL (ref 4.1–5.7)
THERAPEUTIC RANGE,PTTT: NORMAL SECS (ref 58–77)
WBC # BLD AUTO: 5.5 K/UL (ref 4.1–11.1)

## 2017-03-10 PROCEDURE — 97116 GAIT TRAINING THERAPY: CPT

## 2017-03-10 PROCEDURE — 85730 THROMBOPLASTIN TIME PARTIAL: CPT | Performed by: INTERNAL MEDICINE

## 2017-03-10 PROCEDURE — 74011250637 HC RX REV CODE- 250/637: Performed by: ORTHOPAEDIC SURGERY

## 2017-03-10 PROCEDURE — 65270000029 HC RM PRIVATE

## 2017-03-10 PROCEDURE — 74011000636 HC RX REV CODE- 636: Performed by: INTERNAL MEDICINE

## 2017-03-10 PROCEDURE — 36415 COLL VENOUS BLD VENIPUNCTURE: CPT | Performed by: INTERNAL MEDICINE

## 2017-03-10 PROCEDURE — 85240 CLOT FACTOR VIII AHG 1 STAGE: CPT | Performed by: INTERNAL MEDICINE

## 2017-03-10 PROCEDURE — 85025 COMPLETE CBC W/AUTO DIFF WBC: CPT | Performed by: INTERNAL MEDICINE

## 2017-03-10 PROCEDURE — 97530 THERAPEUTIC ACTIVITIES: CPT

## 2017-03-10 RX ADMIN — OXYCODONE HYDROCHLORIDE 10 MG: 5 TABLET ORAL at 18:32

## 2017-03-10 RX ADMIN — MUPIROCIN: 20 OINTMENT TOPICAL at 09:00

## 2017-03-10 RX ADMIN — OXYCODONE HYDROCHLORIDE 10 MG: 5 TABLET ORAL at 12:52

## 2017-03-10 RX ADMIN — MUPIROCIN: 20 OINTMENT TOPICAL at 22:05

## 2017-03-10 RX ADMIN — OXYCODONE HYDROCHLORIDE 10 MG: 5 TABLET ORAL at 22:32

## 2017-03-10 RX ADMIN — ANTIHEMOPHILIC FACTOR (RECOMBINANT) 500 INT'L UNITS: KIT at 22:00

## 2017-03-10 RX ADMIN — ANTIHEMOPHILIC FACTOR (RECOMBINANT) 1000 INT'L UNITS: KIT at 11:36

## 2017-03-10 RX ADMIN — Medication 10 ML: at 13:00

## 2017-03-10 RX ADMIN — Medication 10 ML: at 22:42

## 2017-03-10 RX ADMIN — OXYCODONE HYDROCHLORIDE 10 MG: 5 TABLET ORAL at 04:26

## 2017-03-10 RX ADMIN — ANTIHEMOPHILIC FACTOR (RECOMBINANT) 500 INT'L UNITS: KIT at 11:28

## 2017-03-10 RX ADMIN — ANTIHEMOPHILIC FACTOR (RECOMBINANT) 1000 INT'L UNITS: KIT at 22:06

## 2017-03-10 RX ADMIN — Medication 10 ML: at 14:00

## 2017-03-10 NOTE — PROGRESS NOTES
Ortho/ NeuroSurgery NP Note    Discharge planning note:    I spoke with Dr. Russell Gregorio at length about the plan. The patient will need the full 7 days of factor VIII infusions so she would like us to ensure supply through Wednesday evening dose. The pharmacy is aware. Spoke with case management and found the infusion company, [employer], who has been looking at the patient for home infusions is out of network for his insurance. This leaves [facility] as an option. In speaking with the pharmacy, I was told that [facility] can usually facilitate receiving medications from our in-house pharmacy for patient infusions. This would eliminate the need for an outside specialty pharmacy to ship the medication after discharge possibly leading to delay in doses or missed doses. Dr. Russell Gregorio will continue to follow the patient over the weekend. We should have a better idea of discharge feasibility and plan on Monday. Dr. Russell Gregorio did assure me that, should the patient need one, a PICC line is not contraindicated due to his hemophilia.      Kesha Rodriguez, MARY

## 2017-03-10 NOTE — PROGRESS NOTES
Ortho/ NeuroSurgery NP Note    POD# 2  s/p ORIF OF RIGHT FIBULA FRACTURE    Pt seen with Dr. Devaughn Brush    Pt resting in bed. No complaints. VSS Afebrile. Patient is voiding in adequate amounts. Labs  Lab Results   Component Value Date/Time    HGB 11.8 03/10/2017 04:33 AM      Lab Results   Component Value Date/Time    INR (POC) 1.0 03/08/2017 08:21 AM        Dressing c.d.i, cryotherapy  Calves soft and supple; No pain with passive stretch  Sensation and motor intact  SCDs for mechanical DVT proph while in bed     PLAN:  1) PT BID  2) Anticoagulation contraindicated. 3) Patient with hemophilia and in need of one week treatment with factor VIII. Dr. Cristiana Chavez is following from hematology and case management has been involved. At this time it appears that the home infusion company and outpatient infusions would both need to special order the Factor VIII and this could not be done without a delay in treatment. 4) Plan d/c to home. At this time the patient would need Factor VIII without delay on discharge OR he will need to stay inpatient for administration and monitoring of these infusions.      Kalpesh Martinez NP

## 2017-03-10 NOTE — PROGRESS NOTES
Bedside and Verbal shift change report given to Desirae (oncoming nurse) by Latisha Arenas (offgoing nurse). Report included the following information SBAR, Kardex, ED Summary, OR Summary, Procedure Summary, Intake/Output, MAR, Accordion, Recent Results and Med Rec Status.

## 2017-03-10 NOTE — PROGRESS NOTES
Problem: Mobility Impaired (Adult and Pediatric)  Goal: *Acute Goals and Plan of Care (Insert Text)  Physical Therapy Goals  Initiated 3/9/2017  1. Patient will move from supine to sit and sit to supine , scoot up and down and roll side to side in bed with independence within 7 day(s). 2. Patient will transfer from bed to chair and chair to bed with modified independence using the least restrictive device within 7 day(s). 3. Patient will perform sit to stand with modified independence within 7 day(s). 4. Patient will ambulate with modified independence for 150 feet with the least restrictive device within 7 day(s). 5. Patient will ascend/descend 4 stairs with 2 handrail(s) with modified independence within 7 day(s). PHYSICAL THERAPY TREATMENT  Patient: Thi Olivier (1 y.o. male)  Date: 3/10/2017  Diagnosis: RIGHT ANKLE FRACTURE  Trimalleolar fracture of ankle, closed <principal problem not specified>  Procedure(s) (LRB):  ORIF OF RIGHT FIBULA FRACTURE  (Right) 2 Days Post-Op  Precautions: Fall, NWB  Chart, physical therapy assessment, plan of care and goals were reviewed. ASSESSMENT:  Pt mobilizing well with RW and can maintain % of time. Pt ambulated with RW x 100 ft utilizing a hop to gait pattern. Also observed pt with crutches and pt not as steady, having difficulty placing crutches under arms safely due to gown being in the way. Pt performed 8 steps utilizing both hand rails to hop up/down. Safety cues given to not hop down more than 1 steps at a time. Recommend HHPT and continued use of RW at this time. Progression toward goals:  [X]      Improving appropriately and progressing toward goals  [ ]      Improving slowly and progressing toward goals  [ ]      Not making progress toward goals and plan of care will be adjusted       PLAN:  Patient continues to benefit from skilled intervention to address the above impairments. Continue treatment per established plan of care.   Discharge Recommendations:  Home Health  Further Equipment Recommendations for Discharge:  Pt has RW and crutches       SUBJECTIVE:   Patient stated I really don't have much pain.       OBJECTIVE DATA SUMMARY:   Critical Behavior:  Neurologic State: Alert  Orientation Level: Oriented X4  Cognition: Follows commands     Functional Mobility Training:  Bed Mobility:     Supine to Sit: Modified independent     Scooting: Independent                    Transfers:  Sit to Stand: Stand-by asssistance  Stand to Sit: Stand-by asssistance              Balance:  Sitting: Intact  Standing - Static: Good  Standing - Dynamic : Fair  Ambulation/Gait Training:  Distance (ft): 80 Feet (ft)  Assistive Device: Gait belt;Walker, rolling  Ambulation - Level of Assistance: Stand-by asssistance; Additional time  Gait Abnormalities: Decreased step clearance (hop to pattern)  Right Side Weight Bearing: Non-weight bearing  Left Side Weight Bearing: Full  Base of Support: Narrowed     Speed/Selina: Pace decreased (<100 feet/min)                 Stairs:  Number of Stairs Trained: 8  Stairs - Level of Assistance: Contact guard assistance  Rail Use: Both        Therapeutic Exercises:      Pain:  Pain Scale 1: Numeric (0 - 10)  Pain Intensity 1: 3  Pain Location 1: Leg  Pain Orientation 1: Lower  Pain Description 1: Aching  Pain Intervention(s) 1: Distraction  Activity Tolerance:   HR up to 150 with activity per telemetry     Please refer to the flowsheet for vital signs taken during this treatment.   After treatment:   [ ] Patient left in no apparent distress sitting up in chair  [X] Patient left in no apparent distress in bed  [X] Call bell left within reach  [X] Nursing notified  [ ] Caregiver present  [ ] Bed alarm activated      COMMUNICATION/COLLABORATION:   The patients plan of care was discussed with: Registered Nurse     Alin Burroughs PTA   Time Calculation: 20 mins

## 2017-03-10 NOTE — PROGRESS NOTES
Bedside and Verbal shift change report given to [facility] (oncoming nurse) by Khalida Yusuf (offgoing nurse). Report included the following information SBAR, Kardex, OR Summary, Procedure Summary, Intake/Output, MAR, Recent Results and Med Rec Status.

## 2017-03-10 NOTE — PROGRESS NOTES
Hematology Oncology Progress Note    Reason for consult: Hemophilia A , requiring dae-operative management for Ankle fracture repair    Admitting Diagnosis:   RIGHT ANKLE FRACTURE  Trimalleolar fracture of ankle, closed   Mild Hemophilia A    Interim History:  Some pain in right ankle. 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 Has done well so far. No evidence of bleeding. Will continue to monitor H/H and clinically. PTT and Hb and HCT are stable. Trying to determine if once daily factor replacement will be adequate, in which case outpatient  Factor replacement can be considered for next week to complete 7 days. Can consider iv ddavp as outpatient afetr 7 days for 3 more days to give 10 days of coverage. Plan:  -Keep factor VII at 50% or higher    -Will check levels of Factor VII prior to infusion to get a sense of his levels, and attempt once daily infusions wwhile still in the hospital. If successful, would facilitate outpatient [facility] based replacement therapy next week. -Continue to Monitor Daily Hb and Hct, PTT and clinically for bleeding. -Appreciate case management consult about potential outpatient factor replacement eventually to shorten the duration of hospitalization, if pt is stable. Am available for questions to assist with disposition.    -Following closely with you    -Case d/wnursing staff, Gloria Looney RN     Imaging:  No new imaging data. Labs:    Hb 11.6, PTT 31.3. Last Factor VIII was 124 %.     Past Medical History:   Diagnosis Date    Hemophilia [facility]) dx 0    \"mild\" per pt, younger brother was dx and family was tested      Past Surgical History:   Procedure Laterality Date    HX ORTHOPAEDIC      right ankle procedure to reset dislocation      Prior to Admission medications    Not on File     No Known Allergies Social History   Substance Use Topics    Smoking status: Never Smoker    Smokeless tobacco: Not on file    Alcohol use No      Family History:  Brother with Hemophilia A    Current Medications:  Current Facility-Administered Medications   Medication Dose Route Frequency    diphenhydrAMINE (BENADRYL) injection 50 mg  50 mg IntraVENous ONCE PRN    meperidine (DEMEROL) injection 25 mg  25 mg IntraVENous ONCE PRN    ondansetron (ZOFRAN) injection 8 mg  8 mg IntraVENous ONCE PRN    0.9% sodium chloride infusion  999 mL/hr IntraVENous ONCE PRN    EPINEPHrine HCl (PF) (ADRENALIN) 1 mg/mL (1 mL) injection 0.3 mg  0.3 mg SubCUTAneous ONCE PRN    hydrocortisone Sod Succ (PF) (SOLU-CORTEF) injection 100 mg  100 mg IntraVENous ONCE PRN    diphenhydrAMINE (BENADRYL) injection 50 mg  50 mg IntraVENous ONCE PRN    albuterol (PROVENTIL VENTOLIN) nebulizer solution 2.5 mg  2.5 mg Nebulization ONCE PRN    Factor 8 Potential Adverse Reactions  1 Each Other DAILY    sodium chloride (NS) flush 5-10 mL  5-10 mL IntraVENous Q8H    sodium chloride (NS) flush 5-10 mL  5-10 mL IntraVENous PRN    acetaminophen (TYLENOL) tablet 650 mg  650 mg Oral Q4H PRN    ondansetron (ZOFRAN) injection 4 mg  4 mg IntraVENous Q4H PRN    oxyCODONE IR (ROXICODONE) tablet 5-10 mg  5-10 mg Oral Q4H PRN    HYDROmorphone (PF) (DILAUDID) injection 1 mg  1 mg IntraVENous Q4H PRN    naloxone (NARCAN) injection 0.4 mg  0.4 mg IntraVENous PRN    mupirocin (BACTROBAN) 2 % ointment   Both Nostrils BID    factor VIII (plasma/albumin free) (ADVATE) injection 1,000 Int'l Units  1,000 Int'l Units IntraVENous BID    And    factor VIII (plasma/albumin free) (ADVATE) injection 500 Int'l Units  500 Int'l Units IntraVENous BID         Review of Systems:    Constitutional No fevers, chills, night sweats, excessive fatigue or weight loss. Allergic/Immunologic No recent allergic reactions   Eyes No significant visual difficulties. No diplopia.    ENMT No problems with hearing, no sore throat, no sinus drainage. Endocrine No hot flashes or night sweats. No cold intolerance, polyuria, or polydipsia   Hematologic/Lymphatic No easy bruising or bleeding. The patient denies any tender or palpable lymph nodes   Breasts No abnormal masses of breast, nipple discharge or pain. Respiratory No dyspnea on exertion, orthopnea, chest pain, cough or hemoptysis. Cardiovascular No anginal chest pain, irregular heart beat, tachycardia, palpitations or orthopnea. Gastrointestinal No nausea, vomiting, diarrhea, constipation, cramping, dysphagia, reflux, heartburn, GI bleeding, or early satiety. No change in bowel habits. Genitourinary (M) No hematuria, dysuria, increased frequency, urgency, hesitancy or incontinence. Musculoskeletal R. Ankle pain and some bruising +_swelling or redness. No decreased range of motion. Integumentary No chronic rashes, inflammation, ulcerations, pruritus, petechiae, purpura, ecchymoses, or skin changes. Neurologic No headache, blurred vision, and no areas of focal weakness or numbness. Normal gait. No sensory problems. Psychiatric No insomnia, depression, kimmy or mood swings. No psychotropic drugs. Physical Exam:    Constitutional Alert, cooperative, oriented. Mood and affect appropriate. Appears close to chronological age. Well nourished. Well developed. Head Normocephalic; no scars   Eyes Conjunctivae and sclerae are clear and without icterus. Pupils are reactive and equal.   ENMT Sinuses are nontender. No oral exudates, ulcers, masses, thrush or mucositis. Oropharynx clear. Tongue normal.   Neck Supple without masses or thyromegaly. No jugular venous distension. Hematologic/Lymphatic No petechiae or purpura. No tender or palpable lymph nodes in the cervical, supraclavicular, axillary or inguinal area. Respiratory Lungs are clear to auscultation without rhonchi or wheezing.    Cardiovascular Regular rate and rhythm of heart without murmurs, gallops or rubs. Chest / Line Site Chest is symmetric with no chest wall deformities. Abdomen Non-tender, non-distended, no masses, ascites or hepatosplenomegaly. Good bowel sounds. No guarding or rebound tenderness. No pulsatile masses. Musculoskeletal No tenderness or swelling, normal range of motion without obvious weakness. Extremities No visible deformities, no cyanosis, clubbing or edema. Right ankle in a cast    Skin No rashes, scars, or lesions suggestive of malignancy. No petechiae, purpura, or ecchymoses. No excoriations. Neurologic No sensory or motor deficits, normal cerebellar function, normal gait, cranial nerves intact. Psychiatric Alert and oriented times three. Coherent speech. Verbalizes understanding of our discussions today.

## 2017-03-10 NOTE — INTERDISCIPLINARY ROUNDS
Interdisciplinary Rounds:    Patient's course of treatment, hospital stay and discharge planning were discussed by the interdisciplinary team which includes, nursing, nurse manager, nurse practitioner, care management, rehab therapy representative, rehab liason, and pharmacy. Daily Pt goes, Geometric LOS, Discharge plan and barriers discussed. POD# 2 ankle ORIF patient has hemophilia, needs a total of 7 days of factor 8. Patient NWB RLE continue to work with therapy.

## 2017-03-10 NOTE — PROGRESS NOTES
5563:  Waiting to hear back from [facility] re: possible Factor VIII therapy at home. [facility] is special pharmacy repr and is to be calling CM with info. [id number]:  Agueda from [facility] was just informed after 2 days of communicating with [facility] that [facility] is out of network and [facility] will not authorize out of network benefits. CM will begin trying to communicate with [address] [place] [address] to arrange delivery of Factor 8 for home infusion or at [facility].     Emelyn Patel, MSW

## 2017-03-11 LAB
APTT PPP: 29.6 SEC (ref 22.1–32.5)
BASOPHILS # BLD AUTO: 0 K/UL (ref 0–0.1)
BASOPHILS # BLD: 1 % (ref 0–1)
EOSINOPHIL # BLD: 0.2 K/UL (ref 0–0.4)
EOSINOPHIL NFR BLD: 4 % (ref 0–7)
ERYTHROCYTE [DISTWIDTH] IN BLOOD BY AUTOMATED COUNT: 13.8 % (ref 11.5–14.5)
FACT VIII ACT/NOR PPP: 142 % (ref 80–200)
FACT VIII ACT/NOR PPP: 64 % (ref 80–200)
HCT VFR BLD AUTO: 35.4 % (ref 36.6–50.3)
HGB BLD-MCNC: 11.7 G/DL (ref 12.1–17)
LYMPHOCYTES # BLD AUTO: 24 % (ref 12–49)
LYMPHOCYTES # BLD: 1.3 K/UL (ref 0.8–3.5)
MCH RBC QN AUTO: 28.8 PG (ref 26–34)
MCHC RBC AUTO-ENTMCNC: 33.1 G/DL (ref 30–36.5)
MCV RBC AUTO: 87.2 FL (ref 80–99)
MONOCYTES # BLD: 0.4 K/UL (ref 0–1)
MONOCYTES NFR BLD AUTO: 8 % (ref 5–13)
NEUTS SEG # BLD: 3.5 K/UL (ref 1.8–8)
NEUTS SEG NFR BLD AUTO: 63 % (ref 32–75)
PLATELET # BLD AUTO: 258 K/UL (ref 150–400)
RBC # BLD AUTO: 4.06 M/UL (ref 4.1–5.7)
THERAPEUTIC RANGE,PTTT: NORMAL SECS (ref 58–77)
WBC # BLD AUTO: 5.4 K/UL (ref 4.1–11.1)

## 2017-03-11 PROCEDURE — 85025 COMPLETE CBC W/AUTO DIFF WBC: CPT | Performed by: INTERNAL MEDICINE

## 2017-03-11 PROCEDURE — 74011250637 HC RX REV CODE- 250/637: Performed by: ORTHOPAEDIC SURGERY

## 2017-03-11 PROCEDURE — 74011000636 HC RX REV CODE- 636: Performed by: INTERNAL MEDICINE

## 2017-03-11 PROCEDURE — 36415 COLL VENOUS BLD VENIPUNCTURE: CPT | Performed by: INTERNAL MEDICINE

## 2017-03-11 PROCEDURE — 85240 CLOT FACTOR VIII AHG 1 STAGE: CPT | Performed by: INTERNAL MEDICINE

## 2017-03-11 PROCEDURE — 97116 GAIT TRAINING THERAPY: CPT

## 2017-03-11 PROCEDURE — 65270000029 HC RM PRIVATE

## 2017-03-11 PROCEDURE — 85730 THROMBOPLASTIN TIME PARTIAL: CPT | Performed by: INTERNAL MEDICINE

## 2017-03-11 RX ADMIN — ANTIHEMOPHILIC FACTOR (RECOMBINANT) 500 INT'L UNITS: KIT at 10:00

## 2017-03-11 RX ADMIN — OXYCODONE HYDROCHLORIDE 10 MG: 5 TABLET ORAL at 14:52

## 2017-03-11 RX ADMIN — Medication 10 ML: at 20:00

## 2017-03-11 RX ADMIN — OXYCODONE HYDROCHLORIDE 10 MG: 5 TABLET ORAL at 03:21

## 2017-03-11 RX ADMIN — ANTIHEMOPHILIC FACTOR (RECOMBINANT) 1000 INT'L UNITS: KIT at 09:59

## 2017-03-11 RX ADMIN — OXYCODONE HYDROCHLORIDE 10 MG: 5 TABLET ORAL at 21:09

## 2017-03-11 RX ADMIN — MUPIROCIN: 20 OINTMENT TOPICAL at 08:47

## 2017-03-11 RX ADMIN — Medication 10 ML: at 04:41

## 2017-03-11 RX ADMIN — OXYCODONE HYDROCHLORIDE 10 MG: 5 TABLET ORAL at 08:46

## 2017-03-11 RX ADMIN — MUPIROCIN: 20 OINTMENT TOPICAL at 18:17

## 2017-03-11 RX ADMIN — Medication 10 ML: at 13:58

## 2017-03-11 RX ADMIN — Medication 10 ML: at 21:09

## 2017-03-11 NOTE — PROGRESS NOTES
Hematology Oncology Progress Note    Reason for consult: Hemophilia A , requiring dae-operative management for Ankle fracture repair    Admitting Diagnosis:   RIGHT ANKLE FRACTURE  Trimalleolar fracture of ankle, closed   Mild Hemophilia A    Interim History:   Pain in right ankle is better . 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 Has done well so far. No evidence of bleeding. Will continue to monitor H/H and clinically. PTT and Hb and HCT are stable. Trying to determine if once daily factor replacement will be adequate, in which case outpatient  Factor replacement can be considered for next week to complete 7 days. Can consider iv ddavp as outpatient afetr 7 days for 3 more days to give 10 days of coverage. Plan:  -Keep factor VIII at 50% or higher    -Hold only tonight's dose of Factor VIII and check am level just prior to the morning factor infusin. Send as a Stat to [facility]. If levels staying at or above 50% with onve daily dosing  would facilitate outpatient [facility]IC based replacement therapy next week. -Continue to Monitor Daily Hb and Hct, PTT and clinically for bleeding. -Appreciate case management consult about potential outpatient factor replacement eventually to shorten the duration of hospitalization, if pt is stable. Am available for questions to assist with disposition. -Appreciate Thea Noriega's help with his care greatly. -Following closely with you    -Case d/wnursing staff,     Imaging:  No new imaging data. Labs:    Hb 11.7, PTT 31.3.  Last Factor VIII was 64% ( at goal)    Past Medical History:   Diagnosis Date    Hemophilia [facility]) dx 0    \"mild\" per pt, younger brother was dx and family was tested      Past Surgical History:   Procedure Laterality Date    HX ORTHOPAEDIC      right ankle procedure to reset dislocation Prior to Admission medications    Not on File     No Known Allergies   Social History   Substance Use Topics    Smoking status: Never Smoker    Smokeless tobacco: Not on file    Alcohol use No      Family History:  Brother with Hemophilia A    Current Medications:  Current Facility-Administered Medications   Medication Dose Route Frequency    diphenhydrAMINE (BENADRYL) injection 50 mg  50 mg IntraVENous ONCE PRN    meperidine (DEMEROL) injection 25 mg  25 mg IntraVENous ONCE PRN    ondansetron (ZOFRAN) injection 8 mg  8 mg IntraVENous ONCE PRN    0.9% sodium chloride infusion  999 mL/hr IntraVENous ONCE PRN    EPINEPHrine HCl (PF) (ADRENALIN) 1 mg/mL (1 mL) injection 0.3 mg  0.3 mg SubCUTAneous ONCE PRN    hydrocortisone Sod Succ (PF) (SOLU-CORTEF) injection 100 mg  100 mg IntraVENous ONCE PRN    diphenhydrAMINE (BENADRYL) injection 50 mg  50 mg IntraVENous ONCE PRN    albuterol (PROVENTIL VENTOLIN) nebulizer solution 2.5 mg  2.5 mg Nebulization ONCE PRN    Factor 8 Potential Adverse Reactions  1 Each Other DAILY    sodium chloride (NS) flush 5-10 mL  5-10 mL IntraVENous Q8H    sodium chloride (NS) flush 5-10 mL  5-10 mL IntraVENous PRN    acetaminophen (TYLENOL) tablet 650 mg  650 mg Oral Q4H PRN    ondansetron (ZOFRAN) injection 4 mg  4 mg IntraVENous Q4H PRN    oxyCODONE IR (ROXICODONE) tablet 5-10 mg  5-10 mg Oral Q4H PRN    HYDROmorphone (PF) (DILAUDID) injection 1 mg  1 mg IntraVENous Q4H PRN    naloxone (NARCAN) injection 0.4 mg  0.4 mg IntraVENous PRN    mupirocin (BACTROBAN) 2 % ointment   Both Nostrils BID    factor VIII (plasma/albumin free) (ADVATE) injection 1,000 Int'l Units  1,000 Int'l Units IntraVENous BID    And    factor VIII (plasma/albumin free) (ADVATE) injection 500 Int'l Units  500 Int'l Units IntraVENous BID         Review of Systems:    Constitutional No fevers, chills, night sweats, excessive fatigue or weight loss.    Allergic/Immunologic No recent allergic reactions   Eyes No significant visual difficulties. No diplopia. ENMT No problems with hearing, no sore throat, no sinus drainage. Endocrine No hot flashes or night sweats. No cold intolerance, polyuria, or polydipsia   Hematologic/Lymphatic No easy bruising or bleeding. The patient denies any tender or palpable lymph nodes   Breasts No abnormal masses of breast, nipple discharge or pain. Respiratory No dyspnea on exertion, orthopnea, chest pain, cough or hemoptysis. Cardiovascular No anginal chest pain, irregular heart beat, tachycardia, palpitations or orthopnea. Gastrointestinal No nausea, vomiting, diarrhea, constipation, cramping, dysphagia, reflux, heartburn, GI bleeding, or early satiety. No change in bowel habits. Genitourinary (M) No hematuria, dysuria, increased frequency, urgency, hesitancy or incontinence. Musculoskeletal R. Ankle pain and some bruising +_swelling or redness. No decreased range of motion. Integumentary No chronic rashes, inflammation, ulcerations, pruritus, petechiae, purpura, ecchymoses, or skin changes. Neurologic No headache, blurred vision, and no areas of focal weakness or numbness. Normal gait. No sensory problems. Psychiatric No insomnia, depression, kimmy or mood swings. No psychotropic drugs. Physical Exam:    Constitutional Alert, cooperative, oriented. Mood and affect appropriate. Appears close to chronological age. Well nourished. Well developed. Head Normocephalic; no scars   Eyes Conjunctivae and sclerae are clear and without icterus. Pupils are reactive and equal.   ENMT Sinuses are nontender. No oral exudates, ulcers, masses, thrush or mucositis. Oropharynx clear. Tongue normal.   Neck Supple without masses or thyromegaly. No jugular venous distension. Hematologic/Lymphatic No petechiae or purpura. No tender or palpable lymph nodes in the cervical, supraclavicular, axillary or inguinal area.    Respiratory Lungs are clear to auscultation without rhonchi or wheezing. Cardiovascular Regular rate and rhythm of heart without murmurs, gallops or rubs. Chest / Line Site Chest is symmetric with no chest wall deformities. Abdomen Non-tender, non-distended, no masses, ascites or hepatosplenomegaly. Good bowel sounds. No guarding or rebound tenderness. No pulsatile masses. Musculoskeletal No tenderness or swelling, normal range of motion without obvious weakness. Extremities No visible deformities, no cyanosis, clubbing or edema. Right ankle in a cast    Skin No rashes, scars, or lesions suggestive of malignancy. No petechiae, purpura, or ecchymoses. No excoriations. Neurologic No sensory or motor deficits, normal cerebellar function, normal gait, cranial nerves intact. Psychiatric Alert and oriented times three. Coherent speech. Verbalizes understanding of our discussions today.

## 2017-03-11 NOTE — PROGRESS NOTES
S: sitting in chair, doing well. No pain. No cp or sob.     O:  Visit Vitals    /74 (BP 1 Location: Left arm, BP Patient Position: Sitting)    Pulse 78    Temp 96.3 °F (35.7 °C)    Resp 20    Ht 5' 10\" (1.778 m)    Wt 66.6 kg (146 lb 13.2 oz)    SpO2 99%    BMI 21.07 kg/m2       right lower extremity:  Splint C/D/I    Toes: pink with brisk cap refill    + EHL/FHL    SILT dorsum and plantar toes      A/P: s/p orif R ankle  - pt/ot  - no dvt ppx; ambulation 2/2 hemophilia  - Factor VIIII per Heme/onc  - dispo planning per heme/onc

## 2017-03-11 NOTE — PROGRESS NOTES
Called [facility] [place] lab and was told Factor VIII not being run, despite it being ordered as a STAT and despite our previous discussion with Lab services. They will now run the specimen. Spoke with Ms. Celeste Meredith. She requested that the nursing staff call again tomorrow morning at [phone] to ensure that Factor VIII levels will be run and reported.

## 2017-03-11 NOTE — PROGRESS NOTES
Spoke with pharmacist Ryan Cadet regarding the timing of the 2200 dose for tonight. She checked values and previous dose time and advised that it should be administered at 2200. Sydni Perea RN of pharmacist recommendations.

## 2017-03-11 NOTE — PROGRESS NOTES
Orthopedic End of Shift Note    Bedside shift change report given to Concepcion Hernandez Dr (oncoming nurse) by Blu [place] RN (offgoing nurse). Report included the following information SBAR, Kardex, Intake/Output, MAR and Recent Results. POD    Significant issues during shift:     Issues for Physician to address    Nausea/Vomiting [] yes [x] no     Oropeza Catheter [] in [x] removed    Bowel Movements [x] yes [] no     Foot Pumps or SCD [] yes [x] no    Ice Pack [] yes    [] no    Incentive Spirometer [x] yes [] no    Supplemental O2 [] yes [x] no      Patient Vitals for the past 12 hrs:   Temp Pulse Resp BP SpO2   03/10/17 2039 97.8 °F (36.6 °C) 87 18 143/78 97 %   03/10/17 1616 98 °F (36.7 °C) 86 18 136/85 99 %   03/10/17 1120 98.2 °F (36.8 °C) 87 20 155/90 98 %     Lab Results   Component Value Date/Time    HGB 11.8 03/10/2017 04:33 AM    HCT 36.0 03/10/2017 04:33 AM     Lab Results   Component Value Date/Time    INR (POC) 1.0 03/08/2017 08:21 AM       Intake/Output Summary (Last 24 hours) at 03/10/17 2151  Last data filed at 03/10/17 1120   Gross per 24 hour   Intake                0 ml   Output              225 ml   Net             -225 ml     Wound Ankle Right;Lateral (Active)   DRESSING STATUS Clean, dry, and intact 3/9/2017 10:42 PM   DRESSING TYPE Cast padding;Elastic bandage 3/9/2017 10:42 PM   SPLINT TYPE/MATERIAL Splint, plaster 3/9/2017 10:42 PM   Drainage Amount  None 3/9/2017 10:42 PM   Number of days:2            Peripheral Intravenous Line:  Peripheral IV 03/08/17 Right Hand (Active)   Site Assessment Clean, dry, & intact 3/9/2017 10:42 PM   Phlebitis Assessment 0 3/9/2017 10:42 PM   Infiltration Assessment 0 3/9/2017 10:42 PM   Dressing Status Clean, dry, & intact 3/9/2017 10:42 PM   Dressing Type Tape;Transparent 3/9/2017 10:42 PM   Hub Color/Line Status Pink;Capped 3/9/2017 10:42 PM   Alcohol Cap Used Yes 3/9/2017 12:00 PM     Drain(s): Spencer Centeno

## 2017-03-11 NOTE — PROGRESS NOTES
Problem: Mobility Impaired (Adult and Pediatric)  Goal: *Acute Goals and Plan of Care (Insert Text)  Physical Therapy Goals  Initiated 3/9/2017  1. Patient will move from supine to sit and sit to supine , scoot up and down and roll side to side in bed with independence within 7 day(s). 2. Patient will transfer from bed to chair and chair to bed with modified independence using the least restrictive device within 7 day(s). 3. Patient will perform sit to stand with modified independence within 7 day(s). 4. Patient will ambulate with modified independence for 150 feet with the least restrictive device within 7 day(s). 5. Patient will ascend/descend 4 stairs with 2 handrail(s) with modified independence within 7 day(s). PHYSICAL THERAPY TREATMENT  Patient: Nikkie Martin (31 y.o. male)  Date: 3/11/2017  Diagnosis: RIGHT ANKLE FRACTURE  Trimalleolar fracture of ankle, closed <principal problem not specified>  Procedure(s) (LRB):  ORIF OF RIGHT FIBULA FRACTURE  (Right) 3 Days Post-Op  Precautions: Fall, NWB  Chart, physical therapy assessment, plan of care and goals were reviewed. ASSESSMENT:  Pt resting in bed upon entry. Pt transfers with MOD I and ambulated 100 ft with RW. Pt able to maintain % of time. Pt demonstrated improved safety today as well. HR up to 135 with activity. Pt remained in chair with legs elevated. Recommend HHPT and pt has needed equipment. Progression toward goals:  [X]      Improving appropriately and progressing toward goals  [ ]      Improving slowly and progressing toward goals  [ ]      Not making progress toward goals and plan of care will be adjusted       PLAN:  Patient continues to benefit from skilled intervention to address the above impairments. Continue treatment per established plan of care. Discharge Recommendations:  Home Health  Further Equipment Recommendations for Discharge:  Has equipment       SUBJECTIVE:   Patient stated I'm good.       OBJECTIVE DATA SUMMARY:   Critical Behavior:  Neurologic State: Alert, Appropriate for age  Orientation Level: Appropriate for age, Oriented X4  Cognition: Appropriate decision making, Appropriate for age attention/concentration, Appropriate safety awareness, Follows commands     Functional Mobility Training:  Bed Mobility:     Supine to Sit: Modified independent     Scooting: Independent                    Transfers:  Sit to Stand: Supervision  Stand to Sit: Supervision                 Balance:  Sitting: Intact  Standing - Static: Good  Standing - Dynamic : Fair  Ambulation/Gait Training:  Distance (ft): 100 Feet (ft)  Assistive Device: Gait belt;Walker, rolling  Ambulation - Level of Assistance: Stand-by asssistance        Gait Abnormalities: Decreased step clearance (hop to pattern)  Right Side Weight Bearing: Non-weight bearing  Left Side Weight Bearing: Full     Speed/Selina: Pace decreased (<100 feet/min)  Step Length: Left shortened                    Stairs: Therapeutic Exercises:      Pain:  Pain Scale 1: Numeric (0 - 10)  Pain Intensity 1: 2  Pain Location 1: Leg  Pain Orientation 1: Lower  Pain Description 1: Aching  Pain Intervention(s) 1: Medication (see MAR)  Activity Tolerance:   HR up to 135 with activity     Please refer to the flowsheet for vital signs taken during this treatment.   After treatment:   [X] Patient left in no apparent distress sitting up in chair  [ ] Patient left in no apparent distress in bed  [X] Call bell left within reach  [X] Nursing notified  [ ] Caregiver present  [ ] Bed alarm activated      COMMUNICATION/COLLABORATION:   The patients plan of care was discussed with: Registered Nurse     Anatoliy Amezquita PTA   Time Calculation: 18 mins

## 2017-03-11 NOTE — PROGRESS NOTES
Bedside and Verbal shift change report given to Tawny Treviño RN  (oncoming nurse) by Rafal Funk RN  (offgoing nurse). Report included the following information SBAR, Kardex, Intake/Output, MAR and Recent Results.

## 2017-03-11 NOTE — ROUTINE PROCESS
3/11/2017\8:23 AM  Bedside and Verbal shift change report given to Dinorah CHERRY (oncoming nurse) by Ariana Way RN (offgoing nurse). Report included the following information SBAR, Kardex, OR Summary, Procedure Summary, Intake/Output, MAR, Accordion, Recent Results, Med Rec Status and Cardiac Rhythm NSR/ sinus tach.

## 2017-03-12 LAB
APTT PPP: 33.4 SEC (ref 22.1–32.5)
FACT VIII ACT/NOR PPP: 53 % (ref 80–200)
THERAPEUTIC RANGE,PTTT: ABNORMAL SECS (ref 58–77)

## 2017-03-12 PROCEDURE — 74011000636 HC RX REV CODE- 636: Performed by: INTERNAL MEDICINE

## 2017-03-12 PROCEDURE — 85730 THROMBOPLASTIN TIME PARTIAL: CPT | Performed by: INTERNAL MEDICINE

## 2017-03-12 PROCEDURE — 36415 COLL VENOUS BLD VENIPUNCTURE: CPT | Performed by: INTERNAL MEDICINE

## 2017-03-12 PROCEDURE — 74011250637 HC RX REV CODE- 250/637: Performed by: ORTHOPAEDIC SURGERY

## 2017-03-12 PROCEDURE — 65270000029 HC RM PRIVATE

## 2017-03-12 PROCEDURE — 85240 CLOT FACTOR VIII AHG 1 STAGE: CPT | Performed by: INTERNAL MEDICINE

## 2017-03-12 RX ADMIN — OXYCODONE HYDROCHLORIDE 10 MG: 5 TABLET ORAL at 03:56

## 2017-03-12 RX ADMIN — ANTIHEMOPHILIC FACTOR (RECOMBINANT) 1000 INT'L UNITS: KIT at 11:40

## 2017-03-12 RX ADMIN — ANTIHEMOPHILIC FACTOR (RECOMBINANT) 500 INT'L UNITS: KIT at 11:48

## 2017-03-12 RX ADMIN — OXYCODONE HYDROCHLORIDE 5 MG: 5 TABLET ORAL at 12:24

## 2017-03-12 RX ADMIN — OXYCODONE HYDROCHLORIDE 10 MG: 5 TABLET ORAL at 19:49

## 2017-03-12 RX ADMIN — Medication 10 ML: at 14:00

## 2017-03-12 RX ADMIN — MUPIROCIN: 20 OINTMENT TOPICAL at 09:00

## 2017-03-12 RX ADMIN — Medication 10 ML: at 12:23

## 2017-03-12 NOTE — PROGRESS NOTES
Hematology Oncology Progress Note    Reason for consult: Hemophilia A , requiring dae-operative management for Ankle fracture repair    Admitting Diagnosis:   RIGHT ANKLE FRACTURE  Trimalleolar fracture of ankle, closed   Mild Hemophilia A    Interim History:   Pain in right ankle is better . 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 Has done well so far. No evidence of bleeding. Will continue to monitor H/H and clinically. PTT and Hb and HCT are stable. Trying to determine if once daily factor replacement will be adequate, in which case outpatient  Factor replacement can be considered for next week to complete 7 days. Can consider iv ddavp as outpatient after 7 days for 3 more days to give 10 days of coverage. Plan:  -Keep factor VIII at 50% or higher    -Held last night's dose of Factor VIII and checking am level just prior to the morning factor infusin. Send as a Stat to [facility]. If levels staying at or above 50% with onve daily dosing  would facilitate outpatient [facility]IC based replacement therapy next week. -Continue to Monitor Daily Hb and Hct, PTT and clinically for bleeding. -Appreciate case management consult about potential outpatient factor replacement eventually to shorten the duration of hospitalization, if pt is stable. Am available for questions to assist with disposition. -Appreciate Homer Noriega's help with his care greatly. -Following closely with you. Dr. Barrett Nations to see from tomorrow. I will see him 7- 10 day after discharge in the office    Imaging:  No new imaging data. Labs:    Hb 11.7, PTT 31.3.  Last Factor VIII was 64% ( at goal)    Past Medical History:   Diagnosis Date    Hemophilia [facility]) dx 0    \"mild\" per pt, younger brother was dx and family was tested      Past Surgical History:   Procedure Laterality Date    HX ORTHOPAEDIC      right ankle procedure to reset dislocation      Prior to Admission medications    Not on File     No Known Allergies   Social History   Substance Use Topics    Smoking status: Never Smoker    Smokeless tobacco: Not on file    Alcohol use No      Family History:  Brother with Hemophilia A    Current Medications:  Current Facility-Administered Medications   Medication Dose Route Frequency    factor VIII (plasma/albumin free) (ADVATE, KOGENATE FS) injection 1,000 Int'l Units  1,000 Int'l Units IntraVENous BID    And    factor VIII (plasma/albumin free) (ADVATE, KOGENATE FS) injection 500 Int'l Units  500 Int'l Units IntraVENous BID    diphenhydrAMINE (BENADRYL) injection 50 mg  50 mg IntraVENous ONCE PRN    ondansetron (ZOFRAN) injection 8 mg  8 mg IntraVENous ONCE PRN    0.9% sodium chloride infusion  999 mL/hr IntraVENous ONCE PRN    EPINEPHrine HCl (PF) (ADRENALIN) 1 mg/mL (1 mL) injection 0.3 mg  0.3 mg SubCUTAneous ONCE PRN    hydrocortisone Sod Succ (PF) (SOLU-CORTEF) injection 100 mg  100 mg IntraVENous ONCE PRN    diphenhydrAMINE (BENADRYL) injection 50 mg  50 mg IntraVENous ONCE PRN    albuterol (PROVENTIL VENTOLIN) nebulizer solution 2.5 mg  2.5 mg Nebulization ONCE PRN    Factor 8 Potential Adverse Reactions  1 Each Other DAILY    sodium chloride (NS) flush 5-10 mL  5-10 mL IntraVENous Q8H    sodium chloride (NS) flush 5-10 mL  5-10 mL IntraVENous PRN    acetaminophen (TYLENOL) tablet 650 mg  650 mg Oral Q4H PRN    ondansetron (ZOFRAN) injection 4 mg  4 mg IntraVENous Q4H PRN    oxyCODONE IR (ROXICODONE) tablet 5-10 mg  5-10 mg Oral Q4H PRN    HYDROmorphone (PF) (DILAUDID) injection 1 mg  1 mg IntraVENous Q4H PRN    naloxone (NARCAN) injection 0.4 mg  0.4 mg IntraVENous PRN    mupirocin (BACTROBAN) 2 % ointment   Both Nostrils BID         Review of Systems:    Constitutional No fevers, chills, night sweats, excessive fatigue or weight loss.    Allergic/Immunologic No recent allergic reactions   Eyes No significant visual difficulties. No diplopia. ENMT No problems with hearing, no sore throat, no sinus drainage. Endocrine No hot flashes or night sweats. No cold intolerance, polyuria, or polydipsia   Hematologic/Lymphatic No easy bruising or bleeding. The patient denies any tender or palpable lymph nodes   Breasts No abnormal masses of breast, nipple discharge or pain. Respiratory No dyspnea on exertion, orthopnea, chest pain, cough or hemoptysis. Cardiovascular No anginal chest pain, irregular heart beat, tachycardia, palpitations or orthopnea. Gastrointestinal No nausea, vomiting, diarrhea, constipation, cramping, dysphagia, reflux, heartburn, GI bleeding, or early satiety. No change in bowel habits. Genitourinary (M) No hematuria, dysuria, increased frequency, urgency, hesitancy or incontinence. Musculoskeletal R. Ankle pain and some bruising +_swelling or redness. No decreased range of motion. Integumentary No chronic rashes, inflammation, ulcerations, pruritus, petechiae, purpura, ecchymoses, or skin changes. Neurologic No headache, blurred vision, and no areas of focal weakness or numbness. Normal gait. No sensory problems. Psychiatric No insomnia, depression, kimmy or mood swings. No psychotropic drugs. Physical Exam:    Constitutional Alert, cooperative, oriented. Mood and affect appropriate. Appears close to chronological age. Well nourished. Well developed. Head Normocephalic; no scars   Eyes Conjunctivae and sclerae are clear and without icterus. Pupils are reactive and equal.   ENMT Sinuses are nontender. No oral exudates, ulcers, masses, thrush or mucositis. Oropharynx clear. Tongue normal.   Neck Supple without masses or thyromegaly. No jugular venous distension. Hematologic/Lymphatic No petechiae or purpura. No tender or palpable lymph nodes in the cervical, supraclavicular, axillary or inguinal area.    Respiratory Lungs are clear to auscultation without rhonchi or wheezing. Cardiovascular Regular rate and rhythm of heart without murmurs, gallops or rubs. Chest / Line Site Chest is symmetric with no chest wall deformities. Abdomen Non-tender, non-distended, no masses, ascites or hepatosplenomegaly. Good bowel sounds. No guarding or rebound tenderness. No pulsatile masses. Musculoskeletal No tenderness or swelling, normal range of motion without obvious weakness. Extremities No visible deformities, no cyanosis, clubbing or edema. Right ankle in a cast    Skin No rashes, scars, or lesions suggestive of malignancy. No petechiae, purpura, or ecchymoses. No excoriations. Neurologic No sensory or motor deficits, normal cerebellar function, normal gait, cranial nerves intact. Psychiatric Alert and oriented times three. Coherent speech. Verbalizes understanding of our discussions today.

## 2017-03-12 NOTE — PROGRESS NOTES
S: Doing well. No complaints. No pain. O:  Visit Vitals    /79 (BP 1 Location: Right arm, BP Patient Position: At rest)    Pulse 60    Temp 98 °F (36.7 °C)    Resp 18    Ht 5' 10\" (1.778 m)    Wt 66.6 kg (146 lb 13.2 oz)    SpO2 98%    BMI 21.07 kg/m2       right lower extremity:  Splint C/D/I    Toes: pink with brisk cap refill    + EHL/FHL    SILT dorsum and plantar toes      A/P: s/p R ankle ORIF, hemophilia  - pain control  - no dvt ppx 2/2 hemophilia.  Encourage up out of bed  - NWB RLE  - DC per heme/onc depending on Factor VIII treatment

## 2017-03-12 NOTE — PROGRESS NOTES
Completed instructional training regarding administration of FACTOR VIII to oncoming shift nurse [facility] RN. Used visual aids to complete training for administration and for time period for administration. Explained how to get a stat blood draw utilizing the butterfly needle provided in package.

## 2017-03-12 NOTE — PROGRESS NOTES
Bedside shift change report given to [address] [place] [address] (oncoming nurse) by Geno Chambers RN (offgoing nurse). Report included the following information SBAR, Kardex, Intake/Output, MAR, Accordion and Recent Results.

## 2017-03-12 NOTE — PROGRESS NOTES
Bedside and Verbal shift change report given to Tomy Villa RN  (oncoming nurse) by Daniel Fatima RN (offgoing nurse). Report included the following information SBAR, Kardex, Intake/Output, MAR and Recent Results.

## 2017-03-12 NOTE — PROGRESS NOTES
Orthopedic End of Shift Note    Bedside shift change report given to Valeria Draper (oncoming nurse) by Florida Caballero RN (offgoing nurse). Report included the following information SBAR, Kardex, Intake/Output, MAR and Recent Results. POD#     Significant issues during shift:   Issues for Physician to address    Nausea/Vomiting [] yes [x] no     Oropeza Catheter [] in [x] removed    Bowel Movements [x] yes [] no     Foot Pumps or SCD [] yes [x] no    Ice Pack [] yes    [x] no    Incentive Spirometer [x] yes [] no Volume:     Supplemental O2 [] yes [x] no Sat off O2:       Patient Vitals for the past 12 hrs:   Temp Pulse Resp BP SpO2   03/12/17 1645 98 °F (36.7 °C) 91 18 133/84 98 %   03/12/17 0743 98.6 °F (37 °C) 64 16 122/80 98 %     Lab Results   Component Value Date/Time    HGB 11.7 03/11/2017 04:26 AM    HCT 35.4 03/11/2017 04:26 AM     Lab Results   Component Value Date/Time    INR (POC) 1.0 03/08/2017 08:21 AM       Intake/Output Summary (Last 24 hours) at 03/12/17 1714  Last data filed at 03/11/17 1943   Gross per 24 hour   Intake              480 ml   Output              600 ml   Net             -120 ml     Wound Ankle Right;Lateral (Active)   DRESSING STATUS Clean, dry, and intact 3/11/2017  7:44 PM   DRESSING TYPE Other (Comment) 3/11/2017  4:01 AM   SPLINT TYPE/MATERIAL Splint, plaster 3/11/2017  7:44 PM   Drainage Amount  None 3/11/2017  7:44 PM   Wound Odor None 3/11/2017  7:44 PM   Number of days:4            Peripheral Intravenous Line:  Peripheral IV 03/08/17 Right Hand (Active)   Site Assessment Clean, dry, & intact 3/11/2017  7:44 PM   Phlebitis Assessment 0 3/11/2017  7:44 PM   Infiltration Assessment 0 3/11/2017  7:44 PM   Dressing Status Clean, dry, & intact 3/11/2017  7:44 PM   Dressing Type Tape;Transparent 3/11/2017  7:44 PM   Hub Color/Line Status Pink;Capped 3/11/2017  7:44 PM   Alcohol Cap Used Yes 3/9/2017 12:00 PM     Drain(s): Davide Laureano

## 2017-03-12 NOTE — PROGRESS NOTES
Received results from  Lab at [facility] . Mr Megan's Factor VIII is 53% from this morning. I think once daily dosing will suffice for him and change to once daily dosing. PLan to dc tomorrow after am dose of Factor VIII and return to [facility] daily for factor VIII infusion for 5 days. May leave peripheral iv line in with saline lock, which can be used for infusions. Will let pharmacy and case management know.

## 2017-03-13 LAB
APTT PPP: 33.1 SEC (ref 22.1–32.5)
ERYTHROCYTE [DISTWIDTH] IN BLOOD BY AUTOMATED COUNT: 13.5 % (ref 11.5–14.5)
HCT VFR BLD AUTO: 37.2 % (ref 36.6–50.3)
HGB BLD-MCNC: 12.5 G/DL (ref 12.1–17)
MCH RBC QN AUTO: 28.9 PG (ref 26–34)
MCHC RBC AUTO-ENTMCNC: 33.6 G/DL (ref 30–36.5)
MCV RBC AUTO: 86.1 FL (ref 80–99)
PLATELET # BLD AUTO: 275 K/UL (ref 150–400)
RBC # BLD AUTO: 4.32 M/UL (ref 4.1–5.7)
THERAPEUTIC RANGE,PTTT: ABNORMAL SECS (ref 58–77)
WBC # BLD AUTO: 5.5 K/UL (ref 4.1–11.1)

## 2017-03-13 PROCEDURE — 97116 GAIT TRAINING THERAPY: CPT

## 2017-03-13 PROCEDURE — 65270000029 HC RM PRIVATE

## 2017-03-13 PROCEDURE — 85027 COMPLETE CBC AUTOMATED: CPT | Performed by: INTERNAL MEDICINE

## 2017-03-13 PROCEDURE — 74011250637 HC RX REV CODE- 250/637: Performed by: ORTHOPAEDIC SURGERY

## 2017-03-13 PROCEDURE — 36415 COLL VENOUS BLD VENIPUNCTURE: CPT | Performed by: INTERNAL MEDICINE

## 2017-03-13 PROCEDURE — 97530 THERAPEUTIC ACTIVITIES: CPT

## 2017-03-13 PROCEDURE — 85730 THROMBOPLASTIN TIME PARTIAL: CPT | Performed by: INTERNAL MEDICINE

## 2017-03-13 PROCEDURE — 74011000636 HC RX REV CODE- 636: Performed by: INTERNAL MEDICINE

## 2017-03-13 RX ADMIN — Medication 10 ML: at 14:00

## 2017-03-13 RX ADMIN — Medication 10 ML: at 23:39

## 2017-03-13 RX ADMIN — MUPIROCIN: 20 OINTMENT TOPICAL at 09:00

## 2017-03-13 RX ADMIN — OXYCODONE HYDROCHLORIDE 5 MG: 5 TABLET ORAL at 02:26

## 2017-03-13 RX ADMIN — Medication 10 ML: at 02:26

## 2017-03-13 RX ADMIN — ANTIHEMOPHILIC FACTOR (RECOMBINANT) 500 INT'L UNITS: KIT at 11:31

## 2017-03-13 RX ADMIN — ANTIHEMOPHILIC FACTOR (RECOMBINANT) 1000 INT'L UNITS: KIT at 11:32

## 2017-03-13 RX ADMIN — OXYCODONE HYDROCHLORIDE 5 MG: 5 TABLET ORAL at 13:21

## 2017-03-13 NOTE — PROGRESS NOTES
LAB DRAW DOCUMENTED    Patient arm band scanned:\[id number]\    Lab order(s) as noted in this patients record. I have pre-printed specimen labs, printed by the lab for this patient. LAB LABELS SCANNED:  \[id number]\  \[id number]\  \[id number]\  This patient was identified for this lab draw by comparing and matching the name and MRN (two identifying factors) from the lab label(s) and the patients armband. The sample was drawn, using sterile technique, from Right arm    The sample was obtained on the 1st attempt. The specimens were labeled and sealed in a biohazard bag (AKA lab bag) at bedside.      The following labs were drawn and sent the lab by Thomas Alves RN:    CBC and PTT    The following tubes were sent:    Lavender  ( 1) and Blue  ( 1)

## 2017-03-13 NOTE — PROGRESS NOTES
TRANSFER - OUT REPORT:    Verbal report given to Zari on [employer] for routine progression of care       Report consisted of patients Situation, Background, Assessment and   Recommendations(SBAR). Information from the following report(s) SBAR, Kardex, Admission Summary and Recent Results was reviewed with the receiving nurse. Opportunity for questions and clarification was provided.

## 2017-03-13 NOTE — PROGRESS NOTES
Problem: Mobility Impaired (Adult and Pediatric)  Goal: *Acute Goals and Plan of Care (Insert Text)  Physical Therapy Goals  Initiated 3/9/2017  1. Patient will move from supine to sit and sit to supine , scoot up and down and roll side to side in bed with independence within 7 day(s). 2. Patient will transfer from bed to chair and chair to bed with modified independence using the least restrictive device within 7 day(s). 3. Patient will perform sit to stand with modified independence within 7 day(s). 4. Patient will ambulate with modified independence for 150 feet with the least restrictive device within 7 day(s). 5. Patient will ascend/descend 4 stairs with 2 handrail(s) with modified independence within 7 day(s). PHYSICAL THERAPY TREATMENT  Patient: Everett Medellin (73 y.o. male)  Date: 3/13/2017  Diagnosis: RIGHT ANKLE FRACTURE  Trimalleolar fracture of ankle, closed <principal problem not specified>  Procedure(s) (LRB):  ORIF OF RIGHT FIBULA FRACTURE  (Right) 5 Days Post-Op  Precautions: Fall, NWB  Chart, physical therapy assessment, plan of care and goals were reviewed. ASSESSMENT:  Pt continues to progress well with all mobility. Pt ambulated 150 ft with RW and able to maintain % time. Pt is safe with mobility and has no LOB. Will follow and likely reduce frequency as pt remains in the hospital for factor VIII injections. Progression toward goals:  [X]      Improving appropriately and progressing toward goals  [ ]      Improving slowly and progressing toward goals  [ ]      Not making progress toward goals and plan of care will be adjusted       PLAN:  Patient continues to benefit from skilled intervention to address the above impairments. Continue treatment per established plan of care. Discharge Recommendations:  Home Health  Further Equipment Recommendations for Discharge:  rolling walker-owns       SUBJECTIVE:   Patient stated I'm good.       OBJECTIVE DATA SUMMARY:   Critical Behavior:  Neurologic State: Alert  Orientation Level: Oriented X4  Cognition: Appropriate for age attention/concentration     Functional Mobility Training:  Bed Mobility:     Supine to Sit: Independent                          Transfers:  Sit to Stand: Modified independent  Stand to Sit: Modified independent              Balance:  Sitting: Intact  Standing: Intact; With support  Ambulation/Gait Training:  Distance (ft): 150 Feet (ft)  Assistive Device: Gait belt;Walker, rolling  Ambulation - Level of Assistance: Supervision  Gait Abnormalities: Decreased step clearance (HOP TO PATTERN)  Right Side Weight Bearing: Non-weight bearing  Left Side Weight Bearing: Full  Base of Support: Narrowed     Speed/Selina: Pace decreased (<100 feet/min)                 Stairs: Therapeutic Exercises:      Pain:  Pain Scale 1: Numeric (0 - 10)  Pain Intensity 1: 0  Pain Location 1: Ankle  Pain Orientation 1: Right  Pain Description 1: Aching  Pain Intervention(s) 1: Distraction  Activity Tolerance:   No s/s of distress with activity     Please refer to the flowsheet for vital signs taken during this treatment.   After treatment:   [ ] Patient left in no apparent distress sitting up in chair  [X] Patient left in no apparent distress in bed  [X] Call bell left within reach  [X] Nursing notified  [ ] Caregiver present  [ ] Bed alarm activated      COMMUNICATION/COLLABORATION:   The patients plan of care was discussed with: Registered Nurse     Gerry Dash PTA   Time Calculation: 30 mins

## 2017-03-13 NOTE — PROGRESS NOTES
Hematology Oncology Progress Note    Reason for consult: Hemophilia A , requiring dae-operative management for Ankle fracture repair    Admitting Diagnosis:   RIGHT ANKLE FRACTURE  Trimalleolar fracture of ankle, closed   Mild Hemophilia A    Interim History:   Pain in right ankle is better . 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 Has done well so far. No evidence of bleeding. Will continue to monitor H/H and clinically. PTT and Hb and HCT are stable. Cont Factor replacement daily   Can consider iv ddavp as outpatient after 7 days for 3 more days to give 10 days of coverage. Plan:  -Keep factor VIII at 50% or higher    -Cont daily factor replacement through Friday    -Continue to Monitor Daily Hb and Hct, PTT and clinically for bleeding. -Appreciate case management consult about potential outpatient factor replacement eventually to shorten the duration of hospitalization, if pt is stable. Am available for questions to assist with disposition. Dr. Gucci Turner  will see him 7- 10 day after discharge in the office    Imaging:  No new imaging data.       Labs:    reviewed    Past Medical History:   Diagnosis Date    Hemophilia [facility]) dx 0    \"mild\" per pt, younger brother was dx and family was tested      Past Surgical History:   Procedure Laterality Date    HX ORTHOPAEDIC      right ankle procedure to reset dislocation      Prior to Admission medications    Not on File     No Known Allergies   Social History   Substance Use Topics    Smoking status: Never Smoker    Smokeless tobacco: Not on file    Alcohol use No      Family History:  Brother with Hemophilia A    Current Medications:  Current Facility-Administered Medications   Medication Dose Route Frequency    factor VIII (plasma/albumin free) (ADVATE, KOGENATE FS) injection 500 Int'l Units  500 Int'l Units IntraVENous DAILY    And    factor VIII (plasma/albumin free) (ADVATE, KOGENATE FS) injection 1,000 Int'l Units  1,000 Int'l Units IntraVENous DAILY    ondansetron (ZOFRAN) injection 8 mg  8 mg IntraVENous ONCE PRN    0.9% sodium chloride infusion  999 mL/hr IntraVENous ONCE PRN    EPINEPHrine HCl (PF) (ADRENALIN) 1 mg/mL (1 mL) injection 0.3 mg  0.3 mg SubCUTAneous ONCE PRN    hydrocortisone Sod Succ (PF) (SOLU-CORTEF) injection 100 mg  100 mg IntraVENous ONCE PRN    diphenhydrAMINE (BENADRYL) injection 50 mg  50 mg IntraVENous ONCE PRN    albuterol (PROVENTIL VENTOLIN) nebulizer solution 2.5 mg  2.5 mg Nebulization ONCE PRN    Factor 8 Potential Adverse Reactions  1 Each Other DAILY    sodium chloride (NS) flush 5-10 mL  5-10 mL IntraVENous Q8H    sodium chloride (NS) flush 5-10 mL  5-10 mL IntraVENous PRN    acetaminophen (TYLENOL) tablet 650 mg  650 mg Oral Q4H PRN    ondansetron (ZOFRAN) injection 4 mg  4 mg IntraVENous Q4H PRN    oxyCODONE IR (ROXICODONE) tablet 5-10 mg  5-10 mg Oral Q4H PRN    HYDROmorphone (PF) (DILAUDID) injection 1 mg  1 mg IntraVENous Q4H PRN    naloxone (NARCAN) injection 0.4 mg  0.4 mg IntraVENous PRN    mupirocin (BACTROBAN) 2 % ointment   Both Nostrils BID         Review of Systems:negative    PE  Gen NAD  CV reg  Lungs clear  abd benign

## 2017-03-13 NOTE — PROGRESS NOTES
Ortho / Neurosurgery NP Note    POD# 5  s/p ORIF OF RIGHT FIBULA FRACTURE    Pt seen with Dr. Tabitha Dennis    Pt resting in bed. No complaints. Pain well controlled. VSS Afebrile. Voiding status: voiding independently    Labs  Lab Results   Component Value Date/Time    HGB 12.5 03/13/2017 02:44 AM         Splint / Dressing c.d.i  Sensation and motor intact to toes  Will need to have splint replaced prior to discharge. PLAN:  1) PT daily - NWB on RLE  2) Hemophilia - receiving Factor VIII; Dr. Coleen Alejo managing.  attempting to get auth but was told that could take 24-48 hours. Per Dr. Aime Mai with Hematology, pt will have last dose of medication on Friday.   3) Plan d/c home with family when medication approved vs last dose given here on Friday    Garrett Ba NP

## 2017-03-13 NOTE — PROGRESS NOTES
CLAUDIA contacted pt insurance to get auth for medication for the pt to receive medication at [facility]. CLAUDIA was on the phone over an hour and a half to be informed SW will be contacted by a  in regards to the Daryle Stas within 24-48 hours. Per Dr. Taya Murrieta with Hematology, pt will have last dose of medication on Friday. CLAUDIA will inform the pt of the info provided today. CLAUDIA will continue to follow and assist with additional discharge needs.     Raeann Babinski, MSW  Ext [phone]

## 2017-03-13 NOTE — PROGRESS NOTES
2003 Shift change report received from off going nurse. Report given with Kardex, Intake/Output, MAR and Recent Results. Blood pressure 133/84, pulse 91, temperature 98 °F (36.7 °C), resp. rate 18, height 5' 10\" (1.778 m), weight 66.6 kg (146 lb 13.2 oz), SpO2 98 %. Patient alert. He slept much of the night. Shift change report given to on coming nurse. Report given with Kardex, Intake/Output, MAR and Recent Results.        Patient Vitals for the past 24 hrs:   Temp Pulse Resp BP SpO2   03/13/17 0451 98.3 °F (36.8 °C) 89 18 145/78 99 %   03/12/17 2354 98.3 °F (36.8 °C) 96 18 121/65 98 %   03/12/17 2004 98.4 °F (36.9 °C) 90 18 125/77 98 %   03/12/17 1645 98 °F (36.7 °C) 91 18 133/84 98 %   03/12/17 0743 98.6 °F (37 °C) 64 16 122/80 98 %

## 2017-03-13 NOTE — PROGRESS NOTES
Late Entry for 3/1/0/17:  CM had conversation with [address] [place] [address] ([phone])  re: possible shipment of Factor 8 for either home infusion or OPC administration beginning 3/14. CM was told that medication cannot be shipped until exact dose/schedule is known and only when pt is discharged from hospital. Repr stated that they need 72 hours to respond and ship. Obviously this will not work for this pt to have uninterrupted therapy. Repr stated that it might be possible to get a pharmacy override and have a local pharmacy designated.  Number to call for this request would be [phone] for [facility] Insurance (??)  Jef Ramírez, RAHUL

## 2017-03-14 LAB
APTT PPP: 34.7 SEC (ref 22.1–32.5)
ERYTHROCYTE [DISTWIDTH] IN BLOOD BY AUTOMATED COUNT: 13.5 % (ref 11.5–14.5)
GLUCOSE BLD STRIP.AUTO-MCNC: 97 MG/DL (ref 65–100)
HCT VFR BLD AUTO: 37.2 % (ref 36.6–50.3)
HGB BLD-MCNC: 12.3 G/DL (ref 12.1–17)
MCH RBC QN AUTO: 28.8 PG (ref 26–34)
MCHC RBC AUTO-ENTMCNC: 33.1 G/DL (ref 30–36.5)
MCV RBC AUTO: 87.1 FL (ref 80–99)
PLATELET # BLD AUTO: 255 K/UL (ref 150–400)
RBC # BLD AUTO: 4.27 M/UL (ref 4.1–5.7)
SERVICE CMNT-IMP: NORMAL
THERAPEUTIC RANGE,PTTT: ABNORMAL SECS (ref 58–77)
WBC # BLD AUTO: 5.4 K/UL (ref 4.1–11.1)

## 2017-03-14 PROCEDURE — 82962 GLUCOSE BLOOD TEST: CPT

## 2017-03-14 PROCEDURE — 65270000029 HC RM PRIVATE

## 2017-03-14 PROCEDURE — 74011250637 HC RX REV CODE- 250/637: Performed by: ORTHOPAEDIC SURGERY

## 2017-03-14 PROCEDURE — 74011000636 HC RX REV CODE- 636: Performed by: INTERNAL MEDICINE

## 2017-03-14 PROCEDURE — 85027 COMPLETE CBC AUTOMATED: CPT | Performed by: INTERNAL MEDICINE

## 2017-03-14 PROCEDURE — 97116 GAIT TRAINING THERAPY: CPT | Performed by: PHYSICAL THERAPIST

## 2017-03-14 PROCEDURE — 85730 THROMBOPLASTIN TIME PARTIAL: CPT | Performed by: INTERNAL MEDICINE

## 2017-03-14 PROCEDURE — 36415 COLL VENOUS BLD VENIPUNCTURE: CPT | Performed by: INTERNAL MEDICINE

## 2017-03-14 RX ADMIN — OXYCODONE HYDROCHLORIDE 5 MG: 5 TABLET ORAL at 01:52

## 2017-03-14 RX ADMIN — Medication 10 ML: at 16:43

## 2017-03-14 RX ADMIN — Medication 10 ML: at 07:18

## 2017-03-14 RX ADMIN — Medication 10 ML: at 21:17

## 2017-03-14 RX ADMIN — ACETAMINOPHEN 650 MG: 325 TABLET, FILM COATED ORAL at 15:57

## 2017-03-14 RX ADMIN — OXYCODONE HYDROCHLORIDE 5 MG: 5 TABLET ORAL at 19:42

## 2017-03-14 RX ADMIN — ANTIHEMOPHILIC FACTOR (RECOMBINANT) 1000 INT'L UNITS: KIT at 09:15

## 2017-03-14 RX ADMIN — ANTIHEMOPHILIC FACTOR (RECOMBINANT) 500 INT'L UNITS: KIT at 09:15

## 2017-03-14 NOTE — PROGRESS NOTES
Ortho / Neurosurgery NP Note    POD# 6 s/p ORIF OF RIGHT FIBULA FRACTURE    Pt seen with Dr. Vani Childress    Pt resting in bed. No complaints. Pain well controlled. VSS Afebrile. Voiding status: voiding independently    Labs  Lab Results   Component Value Date/Time    HGB 12.3 03/14/2017 05:15 AM         Splint / Dressing c.d.i  Sensation and motor intact to toes  Will need to have splint replaced prior to discharge. PLAN:  1) PT daily - NWB on RLE  2) Hemophilia - receiving Factor VIII; Dr. Gus Paris managing.  attempting to get auth but was told that could take 24-48 hours. Per Dr. Betsy Pena with Hematology, pt will have last dose of medication on Friday.   3) Plan d/c home with family when medication approved vs last dose given here on Friday    Tera Diop NP

## 2017-03-14 NOTE — PROGRESS NOTES
Nutrition Services      Nutrition Screen:  Wt Readings from Last 10 Encounters:   03/08/17 66.6 kg (146 lb 13.2 oz)   02/24/17 64.9 kg (143 lb)     Body mass index is 21.07 kg/(m^2). Supplements:                        _____ ordered ______  declined. _x __  Pt is nutritionally stable at this time, will rescreen in 7 days. __ __    Pt is at nutritional risk and will be rescreened in  days. __ __  Pt is at moderate or high nutritional risk, will refer to RD for assessment.        Judge Hassan  Dietetic Technician, Registered

## 2017-03-14 NOTE — PROGRESS NOTES
S: no new complaints. Pain controlled and considering just using tylenol from her on out. O:  Visit Vitals    /78 (BP 1 Location: Right arm, BP Patient Position: At rest)    Pulse 100    Temp 98 °F (36.7 °C)    Resp 16    Ht 5' 10\" (1.778 m)    Wt 66.6 kg (146 lb 13.2 oz)    SpO2 99%    BMI 21.07 kg/m2       right lower extremity:  Splint C/D/I    Toes: pink with brisk cap refill    + EHL/FHL    SILT dorsum and plantar toes      A/P: s/p ORIF R ankle;  Hemophilia requiring inpatient stay with Factor VIII replacement  - pt/ot  - elevate as needed  - DC per heme/onc

## 2017-03-14 NOTE — PROGRESS NOTES
Orthopedic End of Shift Note    Bedside shift change report given to Aurora Fothergill (oncoming nurse) by Carlos Williamson RN (offgoing nurse). Report included the following information SBAR, Kardex, Procedure Summary, Intake/Output, MAR and Recent Results. POD  Significant issues during shift: Pulse still spiking when ambulating    Issues for Physician to address Cause of pulse spike    Nausea/Vomiting [] yes [x] no     Oropeza Catheter [] in [x] removed    Bowel Movements [x] yes [] no     Foot Pumps or SCD [] yes [x] no    Ice Pack [] yes    [x] no    Incentive Spirometer [x] yes [] no    Supplemental O2 [] yes [x] no Sat off O2:   97     Patient Vitals for the past 12 hrs:   Temp Pulse Resp BP SpO2   03/13/17 1942 97.9 °F (36.6 °C) 98 16 150/83 98 %   03/13/17 1120 97 °F (36.1 °C) 98 18 147/86 97 %     Lab Results   Component Value Date/Time    HGB 12.5 03/13/2017 02:44 AM    HCT 37.2 03/13/2017 02:44 AM     Lab Results   Component Value Date/Time    INR (POC) 1.0 03/08/2017 08:21 AM     No intake or output data in the 24 hours ending 03/13/17 2041  Wound Ankle Right;Lateral (Active)   DRESSING STATUS Clean, dry, and intact 3/11/2017  7:44 PM   DRESSING TYPE Other (Comment) 3/11/2017  4:01 AM   SPLINT TYPE/MATERIAL Splint, plaster 3/11/2017  7:44 PM   Drainage Amount  None 3/11/2017  7:44 PM   Wound Odor None 3/11/2017  7:44 PM   Number of days:5            Peripheral Intravenous Line:  Peripheral IV 03/08/17 Right Hand (Active)   Site Assessment Clean, dry, & intact 3/13/2017  4:51 AM   Phlebitis Assessment 0 3/13/2017  4:51 AM   Infiltration Assessment 0 3/13/2017  4:51 AM   Dressing Status Clean, dry, & intact 3/13/2017  4:51 AM   Dressing Type Transparent 3/13/2017  4:51 AM   Hub Color/Line Status Capped 3/13/2017  4:51 AM   Alcohol Cap Used Yes 3/9/2017 12:00 PM     Drain(s): [place]ri [place]

## 2017-03-14 NOTE — PROGRESS NOTES
Bedside and Verbal shift change report given to javid meyer (oncoming nurse) by Estefania Michaels (offgoing nurse). Report included the following information SBAR, Kardex, Procedure Summary, Intake/Output, MAR, Accordion, Recent Results and Cardiac Rhythm nsr.

## 2017-03-14 NOTE — PROGRESS NOTES
Hematology Oncology Progress Note    Reason for consult: Hemophilia A , requiring dae-operative management for Ankle fracture repair    Admitting Diagnosis:   RIGHT ANKLE FRACTURE  Trimalleolar fracture of ankle, closed   Mild Hemophilia A    Interim History:   Pain in right ankle is better . 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 Has done well so far. No evidence of bleeding. Will continue to monitor H/H and clinically. PTT and Hb and HCT are stable. Cont Factor replacement daily     Plan:  -Keep factor VIII at 50% or higher    -Cont daily factor replacement through Friday    -Continue to Monitor Daily Hb and Hct, PTT and clinically for bleeding. -Appreciate case management consult about potential outpatient factor replacement eventually to shorten the duration of hospitalization, if pt is stable. Am available for questions to assist with disposition. Dr. Evelyn Ventura  will see him 7- 10 day after discharge in the office    Imaging:  No new imaging data.       Labs:    reviewed    Past Medical History:   Diagnosis Date    Hemophilia [facility]) dx 0    \"mild\" per pt, younger brother was dx and family was tested      Past Surgical History:   Procedure Laterality Date    HX ORTHOPAEDIC      right ankle procedure to reset dislocation      Prior to Admission medications    Not on File     No Known Allergies   Social History   Substance Use Topics    Smoking status: Never Smoker    Smokeless tobacco: Not on file    Alcohol use No      Family History:  Brother with Hemophilia A    Current Medications:  Current Facility-Administered Medications   Medication Dose Route Frequency    factor VIII (plasma/albumin free) (ADVATE, KOGENATE FS) injection 500 Int'l Units  500 Int'l Units IntraVENous DAILY    And    factor VIII (plasma/albumin free) (ADVATE, KOGENATE FS) injection 1,000 Int'l Units  1,000 Int'l Units IntraVENous DAILY    ondansetron (ZOFRAN) injection 8 mg  8 mg IntraVENous ONCE PRN    0.9% sodium chloride infusion  999 mL/hr IntraVENous ONCE PRN    EPINEPHrine HCl (PF) (ADRENALIN) 1 mg/mL (1 mL) injection 0.3 mg  0.3 mg SubCUTAneous ONCE PRN    hydrocortisone Sod Succ (PF) (SOLU-CORTEF) injection 100 mg  100 mg IntraVENous ONCE PRN    diphenhydrAMINE (BENADRYL) injection 50 mg  50 mg IntraVENous ONCE PRN    albuterol (PROVENTIL VENTOLIN) nebulizer solution 2.5 mg  2.5 mg Nebulization ONCE PRN    Factor 8 Potential Adverse Reactions  1 Each Other DAILY    sodium chloride (NS) flush 5-10 mL  5-10 mL IntraVENous Q8H    sodium chloride (NS) flush 5-10 mL  5-10 mL IntraVENous PRN    acetaminophen (TYLENOL) tablet 650 mg  650 mg Oral Q4H PRN    ondansetron (ZOFRAN) injection 4 mg  4 mg IntraVENous Q4H PRN    oxyCODONE IR (ROXICODONE) tablet 5-10 mg  5-10 mg Oral Q4H PRN    HYDROmorphone (PF) (DILAUDID) injection 1 mg  1 mg IntraVENous Q4H PRN    naloxone (NARCAN) injection 0.4 mg  0.4 mg IntraVENous PRN         Review of Systems:negative    PE  Gen NAD  CV reg  Lungs clear  abd benign

## 2017-03-14 NOTE — PROGRESS NOTES
Spiritual Care Assessment/Progress Notes    Cherry Tolbert [id number]  [id number]    1955  57 y.o.  male    Patient Telephone Number: There is no home phone number on file. Jainism Affiliation: No preference   Language: English   Extended Emergency Contact Information  Primary Emergency Contact: [address]  Mobile Phone: [phone]  Relation: Girlfriend  Secondary Emergency Contact: [address], [address]  Mobile Phone: [phone]  Relation: Brother   Patient Active Problem List    Diagnosis Date Noted    Trimalleolar fracture of ankle, closed 03/08/2017    Dislocation of right ankle joint 02/24/2017    Closed trimalleolar fracture of right ankle 02/24/2017        Date: 3/14/2017       Level of Jainism/Spiritual Activity:  []         Involved in renata tradition/spiritual practice    []         Not involved in renata tradition/spiritual practice  [x]         Spiritually oriented    []         Claims no spiritual orientation    []         seeking spiritual identity  []         Feels alienated from Zoroastrianism practice/tradition  []         Feels angry about Zoroastrianism practice/tradition  [x]         Spirituality/Zoroastrianism tradition is a resource for coping at this time.   []         Not able to assess due to medical condition    Services Provided Today:  []         crisis intervention    []         reading Scriptures  [x]         spiritual assessment    []         prayer  []         empathic listening/emotional support  []         rites and rituals (cite in comments)  []         life review     [x]         Zoroastrianism support  []         theological development   []         advocacy  []         ethical dialog     []         blessing  []         bereavement support    []         support to family  []         anticipatory grief support   []         help with AMD  []         spiritual guidance    []         meditation      Spiritual Care Needs  []         Emotional Support  []         Spiritual/Mormonism Care  []         Loss/Adjustment  []         Advocacy/Referral                /Ethics  [x]         No needs expressed at               this time  []         Other: (note in               comments)  Spiritual Care Plan  []         Follow up visits with               pt/family  []         Provide materials  []         Schedule sacraments  []         Contact Community               Clergy  [x]         Follow up as needed  []         Other: (note in               comments)     Comments:   Initial visit with patient on orthopedic floor. Patient shared of his diagnosis and what is keeping him here for an extended stay. 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 I provided supportive listening to patient. Patient is loosely affiliated with the [place] [id number]; his mother was born and raised in [place] and they were [place] [employer]; attended a [employer] here in the [place]. Presence and care provided to patient. Assured pt of pastoral care support and presence. Pastoral care is available to follow as needed. Please page [phone]. Visit by: Juice Frausto. Katiuska Byrd.  Jeannie Novoa MA, [facility]    Lead  Profession Development & Advancement

## 2017-03-14 NOTE — PROGRESS NOTES
Problem: Mobility Impaired (Adult and Pediatric)  Goal: *Acute Goals and Plan of Care (Insert Text)  Physical Therapy Goals  Initiated 3/9/2017  1. Patient will move from supine to sit and sit to supine , scoot up and down and roll side to side in bed with independence within 7 day(s). 2. Patient will transfer from bed to chair and chair to bed with modified independence using the least restrictive device within 7 day(s). 3. Patient will perform sit to stand with modified independence within 7 day(s). 4. Patient will ambulate with modified independence for 150 feet with the least restrictive device within 7 day(s). 5. Patient will ascend/descend 4 stairs with 2 handrail(s) with modified independence within 7 day(s). PHYSICAL THERAPY TREATMENT  Patient: Humberto Mendez (42 y.o. male)  Date: 3/14/2017  Diagnosis: RIGHT ANKLE FRACTURE  Trimalleolar fracture of ankle, closed <principal problem not specified>  Procedure(s) (LRB):  ORIF OF RIGHT FIBULA FRACTURE  (Right) 6 Days Post-Op  Precautions: Fall, NWB      ASSESSMENT:  Patient continues with NWB ambulation for 150 feet with a RW without difficulty. Steady gait. Cooperative and motivated. Returned to the bedside chair and RLE elevated. No home needs anticipated. Progression toward goals:  [ ]    Improving appropriately and progressing toward goals  [X]    Improving slowly and progressing toward goals  [ ]    Not making progress toward goals and plan of care will be adjusted       PLAN:  Patient continues to benefit from skilled intervention to address the above impairments. Continue treatment per established plan of care. Discharge Recommendations:  None  Further Equipment Recommendations for Discharge:  none       SUBJECTIVE:   Patient stated I can get up and walk.       OBJECTIVE DATA SUMMARY:   Critical Behavior:  Neurologic State: Alert  Orientation Level: Oriented X4  Cognition: Appropriate decision making     Functional Mobility Training:  Bed Mobility:  Rolling: Independent  Supine to Sit: Independent     Scooting: Independent        Transfers:  Sit to Stand: Independent  Stand to Sit: Independent                                   Ambulation/Gait Training:  Distance (ft): 150 Feet (ft)  Assistive Device: Gait belt                                                                                Pain:  Pain Scale 1: Numeric (0 - 10)  Pain Intensity 1: 2              Activity Tolerance:   Good. Please refer to the flowsheet for vital signs taken during this treatment.   After treatment:   [ ]    Patient left in no apparent distress sitting up in chair  [ ]    Patient left in no apparent distress in bed  [X]    Call bell left within reach  [X]    Nursing notified  [ ]    Caregiver present  [ ]    Bed alarm activated      COMMUNICATION/COLLABORATION:   The patients plan of care was discussed with: Registered Nurse     Iveth Herbert, PT   Time Calculation: 19 mins

## 2017-03-15 LAB
ERYTHROCYTE [DISTWIDTH] IN BLOOD BY AUTOMATED COUNT: 13.5 % (ref 11.5–14.5)
FACT VIII ACT/NOR PPP: 33 % (ref 80–200)
HCT VFR BLD AUTO: 38.5 % (ref 36.6–50.3)
HGB BLD-MCNC: 13 G/DL (ref 12.1–17)
MCH RBC QN AUTO: 29.7 PG (ref 26–34)
MCHC RBC AUTO-ENTMCNC: 33.8 G/DL (ref 30–36.5)
MCV RBC AUTO: 87.9 FL (ref 80–99)
PLATELET # BLD AUTO: 255 K/UL (ref 150–400)
RBC # BLD AUTO: 4.38 M/UL (ref 4.1–5.7)
WBC # BLD AUTO: 4.9 K/UL (ref 4.1–11.1)

## 2017-03-15 PROCEDURE — 74011000636 HC RX REV CODE- 636: Performed by: INTERNAL MEDICINE

## 2017-03-15 PROCEDURE — 85240 CLOT FACTOR VIII AHG 1 STAGE: CPT | Performed by: INTERNAL MEDICINE

## 2017-03-15 PROCEDURE — 74011250637 HC RX REV CODE- 250/637: Performed by: ORTHOPAEDIC SURGERY

## 2017-03-15 PROCEDURE — 65270000029 HC RM PRIVATE

## 2017-03-15 PROCEDURE — 97116 GAIT TRAINING THERAPY: CPT

## 2017-03-15 PROCEDURE — 36415 COLL VENOUS BLD VENIPUNCTURE: CPT | Performed by: INTERNAL MEDICINE

## 2017-03-15 PROCEDURE — 85027 COMPLETE CBC AUTOMATED: CPT | Performed by: INTERNAL MEDICINE

## 2017-03-15 RX ADMIN — OXYCODONE HYDROCHLORIDE 5 MG: 5 TABLET ORAL at 08:44

## 2017-03-15 RX ADMIN — ANTIHEMOPHILIC FACTOR (RECOMBINANT) 500 INT'L UNITS: KIT at 10:22

## 2017-03-15 RX ADMIN — Medication 10 ML: at 06:26

## 2017-03-15 RX ADMIN — OXYCODONE HYDROCHLORIDE 5 MG: 5 TABLET ORAL at 20:39

## 2017-03-15 RX ADMIN — ANTIHEMOPHILIC FACTOR (RECOMBINANT) 1000 INT'L UNITS: KIT at 10:22

## 2017-03-15 RX ADMIN — Medication 10 ML: at 22:36

## 2017-03-15 RX ADMIN — ANTIHEMOPHILIC FACTOR (RECOMBINANT) 500 INT'L UNITS: KIT at 21:56

## 2017-03-15 RX ADMIN — Medication 5 ML: at 13:24

## 2017-03-15 RX ADMIN — ANTIHEMOPHILIC FACTOR (RECOMBINANT) 1000 INT'L UNITS: KIT at 21:56

## 2017-03-15 NOTE — PROGRESS NOTES
Bedside and Verbal shift change report given to Carter Delgado (oncoming nurse) by Chriss Nunez (offgoing nurse). Report included the following information SBAR, Kardex, MAR and Recent Results.

## 2017-03-15 NOTE — PROGRESS NOTES
Problem: Mobility Impaired (Adult and Pediatric)  Goal: *Acute Goals and Plan of Care (Insert Text)  Physical Therapy Goals  Initiated 3/9/2017  1. Patient will move from supine to sit and sit to supine , scoot up and down and roll side to side in bed with independence within 7 day(s). 2. Patient will transfer from bed to chair and chair to bed with modified independence using the least restrictive device within 7 day(s). 3. Patient will perform sit to stand with modified independence within 7 day(s). 4. Patient will ambulate with modified independence for 150 feet with the least restrictive device within 7 day(s). 5. Patient will ascend/descend 4 stairs with 2 handrail(s) with modified independence within 7 day(s). PHYSICAL THERAPY TREATMENT  Patient: Patrica Fortune (50 y.o. male)  Date: 3/15/2017  Diagnosis: RIGHT ANKLE FRACTURE  Trimalleolar fracture of ankle, closed <principal problem not specified>  Procedure(s) (LRB):  ORIF OF RIGHT FIBULA FRACTURE  (Right) 7 Days Post-Op  Precautions: Fall, NWB  Chart, physical therapy assessment, plan of care and goals were reviewed. ASSESSMENT:  Pt will remain admitted to hospital until Friday for factor VIII injections. Pt has met PT goals and is safe with mobilization using RW. Pt ambulated 150 ft with RW with a hop to pattern. Pt also performed 4 steps with bilateral hand rails. Pt able to maintain % time. reccommend HHPT and pt has needed equipment at home. Progression toward goals:  [X]      Improving appropriately and progressing toward goals  [ ]      Improving slowly and progressing toward goals  [ ]      Not making progress toward goals and plan of care will be adjusted       PLAN:  Patient will be discharged from physical therapy at this time.   Rationale for discharge:  [X] Goals Achieved  [ ] Lucia Lay  [ ] Patient not participating in therapy  [ ] Other:  Discharge Recommendations:  Home Health  Further Equipment Recommendations for Discharge:  Has equipment        SUBJECTIVE:   Patient stated I'm just hanging out here.       OBJECTIVE DATA SUMMARY:   Critical Behavior:  Neurologic State: Alert  Orientation Level: Oriented X4  Cognition: Appropriate decision making, Appropriate for age attention/concentration, Appropriate safety awareness, Follows commands     Functional Mobility Training:  Bed Mobility:     Supine to Sit: Independent     Scooting: Independent                    Transfers:  Sit to Stand: Independent  Stand to Sit: Independent              Balance:  Sitting: Intact  Standing: Intact; With support  Ambulation/Gait Training:  Distance (ft): 150 Feet (ft)  Assistive Device: Gait belt;Walker, rolling  Ambulation - Level of Assistance: Modified independent  Gait Abnormalities: Decreased step clearance (hop to pattern)  Right Side Weight Bearing: Non-weight bearing  Left Side Weight Bearing: Full  Base of Support: Narrowed     Speed/Selina: Pace decreased (<100 feet/min)        Stairs:  Number of Stairs Trained: 4  Stairs - Level of Assistance: Supervision              Rail Use: Both     Therapeutic Exercises:      Pain:  Pain Scale 1: Numeric (0 - 10)  Pain Intensity 1: 0  Pain Location 1: Ankle  Pain Orientation 1: Right  Pain Description 1: Aching;Constant  Pain Intervention(s) 1: Medication (see MAR)  Activity Tolerance:   HR up to 140 with activity     Please refer to the flowsheet for vital signs taken during this treatment.   After treatment:   [ ] Patient left in no apparent distress sitting up in chair  [X] Patient left in no apparent distress in bed  [X] Call bell left within reach  [X] Nursing notified  [ ] Caregiver present  [ ] Bed alarm activated      COMMUNICATION/COLLABORATION:   The patients plan of care was discussed with: Registered Nurse     Mitchel Parada PTA   Time Calculation: 14 mins

## 2017-03-15 NOTE — PROGRESS NOTES
Bedside and Verbal shift change report given to [address] [place] [address] (oncoming nurse) by Juan Pablo Marquez (offgoing nurse). Report included the following information SBAR, Kardex, MAR, Recent Results and Cardiac Rhythm NSR; Sinus Tach.

## 2017-03-15 NOTE — PROGRESS NOTES
Hematology Oncology Progress Note    Reason for consult: Hemophilia A , requiring dae-operative management for Ankle fracture repair    Admitting Diagnosis:   RIGHT ANKLE FRACTURE  Trimalleolar fracture of ankle, closed   Mild Hemophilia A    Interim History:   Pain in right ankle is better . 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 Has done well so far. No evidence of bleeding. Will continue to monitor H/H and clinically. Factor level low this am at 33, want to keep above 50  Increase Factor replacement back to twice daily through Friday      Dr. Peace Brito  will see him in office next week    Imaging:  No new imaging data.       Labs:    reviewed    Past Medical History:   Diagnosis Date    Hemophilia [facility]) dx 0    \"mild\" per pt, younger brother was dx and family was tested      Past Surgical History:   Procedure Laterality Date    HX ORTHOPAEDIC      right ankle procedure to reset dislocation      Prior to Admission medications    Not on File     No Known Allergies   Social History   Substance Use Topics    Smoking status: Never Smoker    Smokeless tobacco: Not on file    Alcohol use No      Family History:  Brother with Hemophilia A    Current Medications:  Current Facility-Administered Medications   Medication Dose Route Frequency    factor VIII (plasma/albumin free) (ADVATE, KOGENATE FS) injection 500 Int'l Units  500 Int'l Units IntraVENous DAILY    And    factor VIII (plasma/albumin free) (ADVATE, KOGENATE FS) injection 1,000 Int'l Units  1,000 Int'l Units IntraVENous DAILY    ondansetron (ZOFRAN) injection 8 mg  8 mg IntraVENous ONCE PRN    0.9% sodium chloride infusion  999 mL/hr IntraVENous ONCE PRN    EPINEPHrine HCl (PF) (ADRENALIN) 1 mg/mL (1 mL) injection 0.3 mg  0.3 mg SubCUTAneous ONCE PRN    hydrocortisone Sod Succ (PF) (SOLU-CORTEF) injection 100 mg  100 mg IntraVENous ONCE PRN    diphenhydrAMINE (BENADRYL) injection 50 mg  50 mg IntraVENous ONCE PRN    albuterol (PROVENTIL VENTOLIN) nebulizer solution 2.5 mg  2.5 mg Nebulization ONCE PRN    Factor 8 Potential Adverse Reactions  1 Each Other DAILY    sodium chloride (NS) flush 5-10 mL  5-10 mL IntraVENous Q8H    sodium chloride (NS) flush 5-10 mL  5-10 mL IntraVENous PRN    acetaminophen (TYLENOL) tablet 650 mg  650 mg Oral Q4H PRN    ondansetron (ZOFRAN) injection 4 mg  4 mg IntraVENous Q4H PRN    oxyCODONE IR (ROXICODONE) tablet 5-10 mg  5-10 mg Oral Q4H PRN    HYDROmorphone (PF) (DILAUDID) injection 1 mg  1 mg IntraVENous Q4H PRN    naloxone (NARCAN) injection 0.4 mg  0.4 mg IntraVENous PRN         Review of Systems:negative    PE  Gen NAD  CV reg  Lungs clear  abd benign

## 2017-03-15 NOTE — PROGRESS NOTES
Ortho / Neurosurgery NP Note    POD# 7 s/p ORIF OF RIGHT FIBULA FRACTURE        Pt resting in bed. No complaints. Pain well controlled. No discomfort or tightness beneath dressing. VSS Afebrile. Voiding status: voiding independently    Labs  Lab Results   Component Value Date/Time    HGB 13.0 03/15/2017 04:37 AM         Splint / Dressing c.d.i  Sensation and motor intact to toes  Will need to have splint replaced prior to discharge. PLAN:  1) PT daily - NWB on RLE  2) Hemophilia - receiving Factor VIII; Dr. Nain Watkins managing. To have last dose of Factor VIII Friday. Patient noted he is back to BID dosing and wonders if he will need to stay for his night dose on Friday. Defer to Dr. Soheila Lance. 3) Plan d/c home with family when medication course complete.      Nirmala Hall NP

## 2017-03-15 NOTE — PROGRESS NOTES
Bedside and Verbal shift change report given to Niesha RN (oncoming nurse) by Damaris Sanz RN (offgoing nurse). Report included the following information SBAR, Kardex, OR Summary, Procedure Summary, Intake/Output, MAR and Recent Results.

## 2017-03-15 NOTE — PROGRESS NOTES
Bedside shift change report given to Sharri (oncoming nurse) by Daniel Toney (offgoing nurse). Report included the following information SBAR, Intake/Output, MAR, Accordion and Recent Results.

## 2017-03-16 LAB
APTT PPP: 35 SEC (ref 22.1–32.5)
ERYTHROCYTE [DISTWIDTH] IN BLOOD BY AUTOMATED COUNT: 13.4 % (ref 11.5–14.5)
FACT VIII ACT/NOR PPP: 44 % (ref 80–200)
HCT VFR BLD AUTO: 36.9 % (ref 36.6–50.3)
HGB BLD-MCNC: 12.4 G/DL (ref 12.1–17)
MCH RBC QN AUTO: 29.5 PG (ref 26–34)
MCHC RBC AUTO-ENTMCNC: 33.6 G/DL (ref 30–36.5)
MCV RBC AUTO: 87.9 FL (ref 80–99)
PLATELET # BLD AUTO: 246 K/UL (ref 150–400)
RBC # BLD AUTO: 4.2 M/UL (ref 4.1–5.7)
THERAPEUTIC RANGE,PTTT: ABNORMAL SECS (ref 58–77)
WBC # BLD AUTO: 5.5 K/UL (ref 4.1–11.1)

## 2017-03-16 PROCEDURE — 74011250637 HC RX REV CODE- 250/637: Performed by: ORTHOPAEDIC SURGERY

## 2017-03-16 PROCEDURE — 74011000636 HC RX REV CODE- 636: Performed by: INTERNAL MEDICINE

## 2017-03-16 PROCEDURE — 85240 CLOT FACTOR VIII AHG 1 STAGE: CPT | Performed by: INTERNAL MEDICINE

## 2017-03-16 PROCEDURE — 85027 COMPLETE CBC AUTOMATED: CPT | Performed by: INTERNAL MEDICINE

## 2017-03-16 PROCEDURE — 36415 COLL VENOUS BLD VENIPUNCTURE: CPT | Performed by: INTERNAL MEDICINE

## 2017-03-16 PROCEDURE — 85730 THROMBOPLASTIN TIME PARTIAL: CPT | Performed by: INTERNAL MEDICINE

## 2017-03-16 PROCEDURE — 65270000029 HC RM PRIVATE

## 2017-03-16 RX ADMIN — Medication 10 ML: at 17:00

## 2017-03-16 RX ADMIN — Medication 10 ML: at 06:02

## 2017-03-16 RX ADMIN — ANTIHEMOPHILIC FACTOR (RECOMBINANT) 1000 INT'L UNITS: KIT at 09:57

## 2017-03-16 RX ADMIN — ANTIHEMOPHILIC FACTOR (RECOMBINANT) 1000 INT'L UNITS: KIT at 22:00

## 2017-03-16 RX ADMIN — ANTIHEMOPHILIC FACTOR (RECOMBINANT) 500 INT'L UNITS: KIT at 09:57

## 2017-03-16 RX ADMIN — OXYCODONE HYDROCHLORIDE 5 MG: 5 TABLET ORAL at 22:43

## 2017-03-16 RX ADMIN — ANTIHEMOPHILIC FACTOR (RECOMBINANT) 500 INT'L UNITS: KIT at 22:26

## 2017-03-16 RX ADMIN — OXYCODONE HYDROCHLORIDE 10 MG: 5 TABLET ORAL at 12:28

## 2017-03-16 NOTE — PROGRESS NOTES
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 Has done well so far. No evidence of bleeding. Will continue to monitor H/H and clinically. Factor level low yesterday at 33, want to keep at least around 40-50 or above, 44 today good  Increased Factor replacement yesterday back to twice daily through Friday      Dr. Michael Montana  will see him in office next week    Imaging:  No new imaging data.       Labs:    reviewed    Past Medical History:   Diagnosis Date    Hemophilia [facility]) dx 0    \"mild\" per pt, younger brother was dx and family was tested      Past Surgical History:   Procedure Laterality Date    HX ORTHOPAEDIC      right ankle procedure to reset dislocation      Prior to Admission medications    Not on File     No Known Allergies   Social History   Substance Use Topics    Smoking status: Never Smoker    Smokeless tobacco: Not on file    Alcohol use No      Family History:  Brother with Hemophilia A    Current Medications:  Current Facility-Administered Medications   Medication Dose Route Frequency    factor VIII (plasma/albumin free) (ADVATE, KOGENATE FS) injection 1,000 Int'l Units  1,000 Int'l Units IntraVENous Q12H    And    factor VIII (plasma/albumin free) (ADVATE, KOGENATE FS) injection 500 Int'l Units  500 Int'l Units IntraVENous Q12H    Factor 8 Potential Adverse Reactions  1 Each Other BID    ondansetron (ZOFRAN) injection 8 mg  8 mg IntraVENous ONCE PRN    0.9% sodium chloride infusion  999 mL/hr IntraVENous ONCE PRN    EPINEPHrine HCl (PF) (ADRENALIN) 1 mg/mL (1 mL) injection 0.3 mg  0.3 mg SubCUTAneous ONCE PRN    hydrocortisone Sod Succ (PF) (SOLU-CORTEF) injection 100 mg  100 mg IntraVENous ONCE PRN    diphenhydrAMINE (BENADRYL) injection 50 mg  50 mg IntraVENous ONCE PRN    albuterol (PROVENTIL VENTOLIN) nebulizer solution 2.5 mg  2.5 mg Nebulization ONCE PRN    sodium chloride (NS) flush 5-10 mL  5-10 mL IntraVENous Q8H    sodium chloride (NS) flush 5-10 mL  5-10 mL IntraVENous PRN    acetaminophen (TYLENOL) tablet 650 mg  650 mg Oral Q4H PRN    ondansetron (ZOFRAN) injection 4 mg  4 mg IntraVENous Q4H PRN    oxyCODONE IR (ROXICODONE) tablet 5-10 mg  5-10 mg Oral Q4H PRN    HYDROmorphone (PF) (DILAUDID) injection 1 mg  1 mg IntraVENous Q4H PRN    naloxone (NARCAN) injection 0.4 mg  0.4 mg IntraVENous PRN         Review of Systems:negative    PE  Gen NAD  CV reg  Lungs clear  abd benign

## 2017-03-16 NOTE — PROGRESS NOTES
Bedside and Verbal shift change report given to [address] [place] [address] (oncoming nurse) by Maria G Collazo (offgoing nurse). Report included the following information SBAR, Kardex, Intake/Output, MAR and Recent Results.

## 2017-03-16 NOTE — PROGRESS NOTES
Ortho / Neurosurgery NP Note    POD# 8 s/p ORIF OF RIGHT FIBULA FRACTURE      Pt resting in bed. No complaints. Pain well controlled. No discomfort or tightness beneath dressing. VSS Afebrile. Voiding status: voiding independently    Labs  Lab Results   Component Value Date/Time    HGB 12.4 03/16/2017 04:43 AM         Splint / Dressing c.d.i  Sensation and motor intact to toes  Will need to have splint replaced prior to discharge. PLAN:  1) PT daily - NWB on RLE  2) Hemophilia - receiving Factor VIII; Dr. Dewey Cabrera managing. To have last dose of Factor VIII Friday. 3) Plan d/c home with family when medication course complete.      Hilary Singh NP

## 2017-03-16 NOTE — ADT AUTH CERT NOTES
Patient Demographics        Patient Name [address] Sex  Address Phone       Tyra Campos [phone] Male 1955 [id number]  Oral [facility] [place] [id number] [phone] (Mobile)           CSN:       [id number]           Admit Date: Admit Time Room Bed       Mar 8, 2017  5:32 AM 3210 [[id number]] 01 [[id number]]           Attending Providers        Provider Pager From To       Sunny Michele MD  17            Emergency Contact(s)        Name Relation Home Work Mobile       Paolo Paul Girlfriend   [phone]       Trell Guzman Brother   [phone]         Utilization Review           Ankle Fracture, Closed, Open Reduction, Internal Fixation (ORIF) - Care Day 7 (3/14/2017) by Casey Reynoso        Review Status Review Entered       Completed 3/16/2017       Details              Care Day: 7 Care Date: 3/14/2017 Level of Care: Inpatient Floor       Guideline Day 2        Clinical Status       (X) * Procedure completed       3/16/2017 10:17 AM EDT by Jerzy Gutierres         vs-97.8  °F (36.6  °C) 83-16,  128/81 -- Sitting 98 % Room air --              (X) * Adequate supported ambulation       (X) * Pain absent or managed       (X) * Other injuries requiring inpatient care absent       (X) * No evidence of postoperative or surgical site infection       ( ) * Discharge plans and education understood              Activity       (X) * Ambulatory with crutches or walker [C]              Routes       (X) * Oral hydration, medications, and diet       3/16/2017 10:17 AM EDT by Jerzy Gutierres         oxycodone po, factor VIII iv                     Interventions       (X) Gait training              Medications       (X) * PCA absent [D]              3/16/2017 10:17 AM EDT by Jerzy Gutierres       Subject: Additional Clinical Information       PTT 34.7. ---------------  PER ORTHO--Splint / Dressing c.d.i  Sensation and motor intact to toes  Will need to have splint replaced prior to discharge.        PLAN:  1) PT daily - NWB on RLE  2) Hemophilia - receiving Factor VIII; Dr. Deana Pandya managing.    attempting to get auth but was told that could take 24-48 hours. Per Dr. Lucas Sainz with Hematology, pt will have last dose of medication on Friday. 3) Plan d/c home with family when medication approved vs last dose given here on Friday.-----------------------PER HEM ONC. 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 Has done well so far. No evidence of bleeding. Will continue to monitor H/H and clinically. PTT and Hb and HCT are stable.     Cont Factor replacement daily       Plan:  -Keep factor VIII at 50% or higher.    -Cont daily factor replacement through Friday.    -Continue to Monitor Daily Hb and Hct, PTT and clinically for bleeding.-----------------[CM working on DC plan .  Not able to get the Med for OP use.    Asked MD about possible   home infusion.   Per Dr. Giuseppe Tolliver   would like for the pt to remain in the hospital until Friday once medication is complete due to the unknown  time frame of medication arriving to pt post discharge. ]                                     * Milestone                  Ankle Fracture, Closed, Open Reduction, Internal Fixation (ORIF) - Care Day 6 (3/13/2017) by Zeina Victor        Review Status Review Entered       Completed 3/16/2017       Details              Care Day: 6 Care Date: 3/13/2017 Level of Care: Inpatient Floor       Guideline Day 2        Clinical Status       (X) * Procedure completed       (X) * Adequate supported ambulation       (X) * Pain absent or managed       (X) * Other injuries requiring inpatient care absent       (X) * No evidence of postoperative or surgical site infection       ( ) * Discharge plans and education understood              Activity       (X) * Ambulatory with crutches or walker [C]              Routes       (X) * Oral hydration, medications, and diet       3/16/2017 10:10 AM EDT by Cristina Mireles     Meds; oxycodone po,   Factor VIII iv at 1100.                     Interventions       (X) Gait training              Medications       (X) * PCA absent [D]              3/16/2017 10:10 AM EDT by Sadia Treviño       Subject: Additional Clinical Information       PTT 33.1-------PER HEM ONC-  Plan:  -Keep factor VIII at 50% or higher.      -Cont daily factor replacement through Friday.      -Continue to Monitor Daily Hb and Hct, PTT and clinically for bleeding.       -Appreciate case management consult about potential outpatient factor replacement eventually to shorten the duration of hospitalization, if pt is stable.-------------------------------------------------------------CM has been   consistently working on DC plans regarding Factor 8 for Home infusion or OPC.   CM was told that medication cannot be shipped until exact dose/schedule is known and only when pt is discharged from hospital. Repr stated that they need 72 hours to respond and ship. (However, patient needs uninterrupted therapy). .CM still seeking a way to provide care post dc.                                    * Milestone                  Ankle Fracture, Closed, Open Reduction, Internal Fixation (ORIF) - Care Day 5 (3/12/2017) by Carter Campo        Review Status Review Entered       Completed 3/16/2017       Details              Care Day: 5 Care Date: 3/12/2017 Level of Care: Inpatient Floor       Guideline Day 2        Clinical Status       (X) * Procedure completed       3/16/2017 10:01 AM EDT by Sadia Treviño         VS--98.6-64-16, /80.  PER ORTHO-   A/P: s/p R ankle ORIF, hemophilia - pain control - no dvt ppx 2/2 hemophilia.  Encourage up out of bed - NWB RLE - DC per heme/onc depending on Factor VIII treatment              (X) * Adequate supported ambulation       (X) * Pain absent or managed       (X) * Other injuries requiring inpatient care absent       (X) * No evidence of postoperative or surgical site infection       ( ) * Discharge plans and education understood              Activity       (X) * Ambulatory with crutches or walker [C]              Routes       (X) * Oral hydration, medications, and diet       3/16/2017 10:01 AM EDT by Frank AMADOR; roxycodone po , FACTOR VIII - iv at 11 am. .                     Interventions       (X) Gait training              Medications       (X) * PCA absent [D]              3/16/2017 10:01 AM EDT by Frank Ingram       Subject: Additional Clinical Information       PTT 33.4.------------PER HEM ONC.      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 Has done well so far. No evidence of bleeding. Will continue to monitor H/H and clinically. Trying to determine if once daily factor replacement will be adequate, in which case outpatient   Factor replacement can be considered for next week to complete 7 days. Can consider iv ddavp as outpatient after 7 days for 3 more days to give 10 days of coverage.      Plan:-Keep factor VIII at 50% or higher.    -Held last night's dose of Factor VIII and checking am level just prior to the morning factor infusin. Send as a Stat to [facility] [place]. If levels staying at or above 50% with onve daily dosing  would facilitate outpatient OPIC based replacement therapy next week.      -Continue to Monitor Daily Hb and Hct, PTT and clinically for bleeding.       -Appreciate case management consult about potential outpatient factor replacement eventually to shorten the duration of hospitalization, if pt is stable. Am available for questions to assist with disposition. Later note--  Factor VIII is 53% from this morning. I think once daily dosing will suffice for him and change to once daily dosing.      PLan to dc tomorrow after am dose of Factor VIII and return to [facility] daily for factor VIII infusion for 5 days.  May leave peripheral iv line in with saline lock, which can be used for infusions.                                        * Milestone                  Ankle Fracture, Closed, Open Reduction, Internal Fixation (ORIF) - Care Day 4 (3/11/2017) by Carter Campo        Review Status Review Entered       Completed 3/16/2017       Details              Care Day: 4 Care Date: 3/11/2017 Level of Care: Inpatient Floor       Guideline Day 2        Clinical Status       (X) * Procedure completed       (X) * Adequate supported ambulation       (X) * Pain absent or managed       (X) * Other injuries requiring inpatient care absent       (X) * No evidence of postoperative or surgical site infection       ( ) * Discharge plans and education understood              Activity       (X) * Ambulatory with crutches or walker [C]       3/16/2017 9:48 AM EDT by Sadia SYKES uses rolling walker. Recommend HH upon dc.                     Routes       (X) * Oral hydration, medications, and diet       3/16/2017 9:48 AM EDT by Sadia Balderas; roxycodone po x 4, FACTOR V111 iv at 0900 and 100. .                     Interventions       (X) Gait training              Medications       (X) * PCA absent [D]                                   * Milestone              Additional Notes       VS 97.7-82-18, 133/90,       ------------PER HEM ONC.        Reason for consult: Hemophilia A , requiring dae-operative management for Ankle fracture repair.        Labs:        Hb 11.7, PTT 31.3. Last Factor VIII was 64% ( at goal)               Admitting Diagnosis:       RIGHT ANKLE FRACTURE. Trimalleolar fracture of ankle, closed . Mild Hemophilia A               Interim History:Pain in right ankle is better . No bleeding or bruising.                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 Has done well so far. No evidence of bleeding.  Will continue to monitor H/H and clinically.        PTT and Hb and HCT are stable.        Trying to determine if once daily factor replacement will be adequate, in which case outpatient Factor replacement can be considered for next week to complete 7 days.        Can consider iv ddavp as outpatient afetr 7 days for 3 more days to give 10 days of coverage.               Plan:-Keep factor VIII at 50% or higher.        -Hold only tonight's dose of Factor VIII and check am level just prior to the morning factor infusin. Send as a Stat to [facility]. If levels staying at or above 50% with onve daily dosing would facilitate outpatient OPIC based replacement therapy next week.               -Continue to Monitor Daily Hb and Hct, PTT and clinically for bleeding.                -Appreciate case management consult about potential outpatient factor replacement eventually to shorten the duration of hospitalization, if pt is stable. Am available for questions to assist with disposition.               --------------PER ORTHO-A/P: s/p orif R ankle       - pt/ot       - no dvt ppx; ambulation 2/2 hemophilia       - Factor VIIII per Heme/onc       - dispo planning per heme/onc.

## 2017-03-16 NOTE — INTERDISCIPLINARY ROUNDS
Bedside interdisciplinary rounds were held today to discuss patient plan of care and outcomes. The following members were present: NP, Nurse, Clinical Care Leader, Pharmacy, and Case Management.     Plan: continue to monitor response to current treatment

## 2017-03-17 VITALS
BODY MASS INDEX: 21.02 KG/M2 | HEIGHT: 70 IN | DIASTOLIC BLOOD PRESSURE: 75 MMHG | RESPIRATION RATE: 16 BRPM | TEMPERATURE: 97.8 F | OXYGEN SATURATION: 99 % | HEART RATE: 84 BPM | WEIGHT: 146.83 LBS | SYSTOLIC BLOOD PRESSURE: 126 MMHG

## 2017-03-17 LAB
APTT PPP: 28 SEC (ref 22.1–32.5)
ERYTHROCYTE [DISTWIDTH] IN BLOOD BY AUTOMATED COUNT: 13.5 % (ref 11.5–14.5)
FACT VIII ACT/NOR PPP: 70 % (ref 80–200)
HCT VFR BLD AUTO: 36.9 % (ref 36.6–50.3)
HGB BLD-MCNC: 12.2 G/DL (ref 12.1–17)
MCH RBC QN AUTO: 28.8 PG (ref 26–34)
MCHC RBC AUTO-ENTMCNC: 33.1 G/DL (ref 30–36.5)
MCV RBC AUTO: 87 FL (ref 80–99)
PLATELET # BLD AUTO: 237 K/UL (ref 150–400)
RBC # BLD AUTO: 4.24 M/UL (ref 4.1–5.7)
THERAPEUTIC RANGE,PTTT: NORMAL SECS (ref 58–77)
WBC # BLD AUTO: 5.2 K/UL (ref 4.1–11.1)

## 2017-03-17 PROCEDURE — 74011000636 HC RX REV CODE- 636: Performed by: INTERNAL MEDICINE

## 2017-03-17 PROCEDURE — 77030028224 HC PDNG CST BSNM -A

## 2017-03-17 PROCEDURE — 85730 THROMBOPLASTIN TIME PARTIAL: CPT | Performed by: INTERNAL MEDICINE

## 2017-03-17 PROCEDURE — 74011250637 HC RX REV CODE- 250/637: Performed by: ORTHOPAEDIC SURGERY

## 2017-03-17 PROCEDURE — 36415 COLL VENOUS BLD VENIPUNCTURE: CPT | Performed by: INTERNAL MEDICINE

## 2017-03-17 PROCEDURE — 85027 COMPLETE CBC AUTOMATED: CPT | Performed by: INTERNAL MEDICINE

## 2017-03-17 PROCEDURE — 85240 CLOT FACTOR VIII AHG 1 STAGE: CPT | Performed by: INTERNAL MEDICINE

## 2017-03-17 RX ORDER — ACETAMINOPHEN 325 MG/1
650 TABLET ORAL EVERY 6 HOURS
Qty: 112 TAB | Refills: 0 | Status: SHIPPED | OUTPATIENT
Start: 2017-03-17 | End: 2017-03-31

## 2017-03-17 RX ORDER — POLYETHYLENE GLYCOL 3350 17 G/17G
17 POWDER, FOR SOLUTION ORAL DAILY
Qty: 255 G | Refills: 0 | Status: SHIPPED | OUTPATIENT
Start: 2017-03-17 | End: 2017-04-01

## 2017-03-17 RX ORDER — OXYCODONE HYDROCHLORIDE 5 MG/1
5-10 TABLET ORAL
Qty: 30 TAB | Refills: 0 | Status: SHIPPED | OUTPATIENT
Start: 2017-03-17

## 2017-03-17 RX ORDER — AMOXICILLIN 250 MG
1 CAPSULE ORAL 2 TIMES DAILY
Qty: 60 TAB | Refills: 0 | Status: SHIPPED | OUTPATIENT
Start: 2017-03-17

## 2017-03-17 RX ADMIN — Medication 10 ML: at 04:50

## 2017-03-17 RX ADMIN — Medication 10 ML: at 06:16

## 2017-03-17 RX ADMIN — ANTIHEMOPHILIC FACTOR (RECOMBINANT) 1000 INT'L UNITS: KIT at 10:11

## 2017-03-17 RX ADMIN — ANTIHEMOPHILIC FACTOR (RECOMBINANT) 500 INT'L UNITS: KIT at 10:11

## 2017-03-17 RX ADMIN — OXYCODONE HYDROCHLORIDE 5 MG: 5 TABLET ORAL at 11:01

## 2017-03-17 NOTE — PROGRESS NOTES
Pt discharged home today with no needs. Per NP, pt declined HH at discharge. Pt transported via private vehicle by family at discharge. Care Management Interventions  PCP Verified by CM: Yes  Mode of Transport at Discharge: Other (see comment) (Girlfriend)  Transition of Care Consult (CM Consult):  Other (Possible home infusion)  Discharge Durable Medical Equipment: No  Physical Therapy Consult: Yes  Occupational Therapy Consult: Yes  Current Support Network: Lives Alone (Will stay w/ GF at OR in [place])  Confirm Follow Up Transport: Friends  Plan discussed with Pt/Family/Caregiver: Yes  Freedom of Choice Offered: Yes  Discharge Location  Discharge Placement: [address] [facility] [place] [facility]  Ext [phone]

## 2017-03-17 NOTE — DISCHARGE SUMMARY
Ortho Discharge Summary    Patient ID:  Keke Lopez  [id number]  male  57 y.o.  1955    Admit date: 3/8/2017    Discharge date: 3/17/2017    Admitting Physician: Mackenzie Arriaga[address] [place] [address] MD Jonh     Consulting Physician(s):   Treatment Team: Attending Provider: Mackenzie Suarez [facility] [address], MD; Consulting Provider: Oswaldo Foster MD; Utilization Review: Alisa Johnson; Care Manager: RAHUL Christianson    Date of Surgery:   3/8/2017     Preoperative Diagnosis:  RIGHT ANKLE FRACTURE    Postoperative Diagnosis:   RIGHT ANKLE FRACTURE    Procedure(s):     ORIF OF RIGHT FIBULA FRACTURE      Anesthesia Type:   General     Surgeon: Mackenzie Hinson [address] [place] [address] MD Jonh                            HPI:  Pt is a 57 y.o. male who has a history of RIGHT ANKLE FRACTURE  with pain and limitations of activities of daily living who presents at this time for a  right Ankle ORIF following the failure of conservative management. PMH:   Past Medical History:   Diagnosis Date    Hemophilia [facility]) dx 0    \"mild\" per pt, younger brother was dx and family was tested       Medications upon admission :   None        Allergies:  No Known Allergies     Hospital Course: The patient underwent surgery. Complications:  None; patient tolerated the procedure well. Was taken to the PACU in stable condition and then transferred to the ortho floor. Perioperative Antibiotics:  Ancef     Postoperative Pain Management:  Oxycodone     DVT Prophylaxis: Contraindicated. Postoperative transfusions:    Number of units banked PRBCs =   none     Post Op complications: none    Hemoglobin at discharge:    Lab Results   Component Value Date/Time    HGB 12.2 03/17/2017 04:52 AM    INR 1.0 03/08/2017 08:21 AM       Dressing was changed on POD # 9. Incision - clean, dry and intact. No significant erythema or swelling. Neurovascular exam found to be within normal limits. Wound appears to be healing without any evidence of infection.     Physical Therapy started on the day following surgery and participated in bed mobility, transfers and ambulation. Gait:  Gait  Base of Support: Narrowed  Speed/Selina: Pace decreased (<100 feet/min)  Step Length: Left shortened  Gait Abnormalities: Decreased step clearance (hop to pattern)  Ambulation - Level of Assistance: Modified independent  Distance (ft): 150 Feet (ft)  Assistive Device: Gait belt, Walker, rolling  Rail Use: Both  Stairs - Level of Assistance: Supervision  Number of Stairs Trained: 4                   Discharged to: Home. Condition on Discharge:   stable    Discharge instructions:  - Take pain medications as prescribed  - Resume pre hospital diet      - Discharge activity: activity as tolerated  - Ambulate with Walker;    - Weight bearing status NWB  - Wound Care Keep wound clean and dry. See discharge instruction sheet.  - Staples, if present, to be removed 10 days after surgery            -DISCHARGE MEDICATION LIST     Current Discharge Medication List      START taking these medications    Details   acetaminophen (TYLENOL) 325 mg tablet Take 2 Tabs by mouth every six (6) hours for 14 days. Qty: 112 Tab, Refills: 0      oxyCODONE IR (ROXICODONE) 5 mg immediate release tablet Take 1-2 Tabs by mouth every four (4) hours as needed. Max Daily Amount: 60 mg.  Qty: 30 Tab, Refills: 0      polyethylene glycol (MIRALAX) 17 gram/dose powder Take 17 g by mouth daily for 15 days. Qty: 255 g, Refills: 0      senna-docusate (SENNA PLUS) 8.6-50 mg per tablet Take 1 Tab by mouth two (2) times a day. Take until having regular bowel movements.   Qty: 60 Tab, Refills: 0          per medical continuation form      -Follow up in office in 1 weeks      Signed:  Rebeka Baltazar  MSN, APRN, FNP-C, [facility]  Orthopaedic Nurse Practitioner    3/17/2017  1:18 PM

## 2017-03-17 NOTE — DISCHARGE INSTRUCTIONS
Shira Wood MD  Postoperative Instructions    Right/Left Lower Extremity:   _X__Non Weight Bearing    ___Postop Shoe   ___Heel touch weightbearing               ___CAM Joan Fanning   ___Weightbearing as tolerated   _X__Splint/Cast    Dressing:   __X_Keep Dressing or Splint clean & dry   _X__Do Not remove dressing; Dressing will be changed at first postoperative visit. Showering:   You may shower 48 hours after your surgery. Do Not allow dressing or splint to get wet! Diet:    Advance diet as tolerated. You may experience nausea due to anesthesia, pain or pain medications. You should avoid spicy or heavy meals initially. Pain Management:   If you have received a block, the pain relief can last from 4-24 hours. This also means you may not have sensation or movement in your foot for the same amount of time.  You will have pain after the block wears off. Anticipate this and start pain medications prior to the block wearing off.  Do Not drive while taking narcotic pain medications. Elevation:   Strict elevation at or just above the level of your heart for the first 14 days after surgery. Limit time that foot is in dependent position for trips to bathroom and kitchen as much as possible. Early control of swelling will improve future swelling. Ice:    __X___ Laura Richie may ice your surgical extremity for no longer than 20 minutes at a time, 3-4 times per day. Do Not apply ice directly to the skin.  _____ Do Not apply ice to surgical extremity. Aspirin therapy:    Please DO NOT take any NSAIDs (Ibuprofen, Advil, Motrin, or Aleve)    Call our office at [phone] if the following occur:   Severe swelling, profuse bleeding or severe pain which does not improve with pain medication.  Fever greater than 101.0; fevers less than this are very common in the first few days after surgery and are unlikely to indicate infection.     Follow up: Next week- call the office to find out your appointment time.

## 2017-03-17 NOTE — PROGRESS NOTES
Ortho / Neurosurgery NP Note    POD# 9 s/p ORIF OF RIGHT FIBULA FRACTURE      Pt resting in bed. No complaints. Pain well controlled. No discomfort or tightness beneath dressing. To be changed today. VSS Afebrile. Voiding status: voiding independently    Labs  Lab Results   Component Value Date/Time    HGB 12.2 03/17/2017 04:52 AM         Splint / Dressing c.d.i- to be changed today. Sensation and motor intact to toes    PLAN:  1) PT daily - NWB on RLE  2) Hemophilia - receiving Factor VIII; Dr. Lisbeth Ellis cleared for discharge today. 3) Plan d/c home with family today.      Mathew Pablo, MARY

## 2017-03-17 NOTE — PROGRESS NOTES
S: doing well.  No new complaints    O:  Visit Vitals    /75 (BP 1 Location: Left arm, BP Patient Position: At rest)    Pulse 82    Temp 98 °F (36.7 °C)    Resp 18    Ht 5' 10\" (1.778 m)    Wt 66.6 kg (146 lb 13.2 oz)    SpO2 97%    BMI 21.07 kg/m2       right lower extremity:  Splint C/D/I    Toes: pink with brisk cap refill    + EHL/FHL    SILT dorsum and plantar toes      A/P: s/p ORIF right ankle; hemophilia  - will change splint prior to DC tomorrow  - DC per heme/onc tomorrow  - NWB RLE  - pt/ot  - no dvt ppx 2/2 hemophilia

## 2017-03-17 NOTE — PROGRESS NOTES
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 Has done well so far. No evidence of bleeding. Will continue to monitor H/H and clinically. Factor level looks good today at 70  Will give morning dose of Factor replacement and then he will be done    OK for discharge from my standpoint adter this morning's dose of factor replacement  Dr. Ela Yang  will see him in office on Monday. Imaging:  No new imaging data.       Labs:    reviewed    Past Medical History:   Diagnosis Date    Hemophilia [facility]) dx 0    \"mild\" per pt, younger brother was dx and family was tested      Past Surgical History:   Procedure Laterality Date    HX ORTHOPAEDIC      right ankle procedure to reset dislocation      Prior to Admission medications    Not on File     No Known Allergies   Social History   Substance Use Topics    Smoking status: Never Smoker    Smokeless tobacco: Not on file    Alcohol use No      Family History:  Brother with Hemophilia A    Current Medications:  Current Facility-Administered Medications   Medication Dose Route Frequency    Factor 8 Potential Adverse Reactions  1 Each Other BID    ondansetron (ZOFRAN) injection 8 mg  8 mg IntraVENous ONCE PRN    0.9% sodium chloride infusion  999 mL/hr IntraVENous ONCE PRN    EPINEPHrine HCl (PF) (ADRENALIN) 1 mg/mL (1 mL) injection 0.3 mg  0.3 mg SubCUTAneous ONCE PRN    hydrocortisone Sod Succ (PF) (SOLU-CORTEF) injection 100 mg  100 mg IntraVENous ONCE PRN    diphenhydrAMINE (BENADRYL) injection 50 mg  50 mg IntraVENous ONCE PRN    albuterol (PROVENTIL VENTOLIN) nebulizer solution 2.5 mg  2.5 mg Nebulization ONCE PRN    sodium chloride (NS) flush 5-10 mL  5-10 mL IntraVENous Q8H    sodium chloride (NS) flush 5-10 mL  5-10 mL IntraVENous PRN    acetaminophen (TYLENOL) tablet 650 mg  650 mg Oral Q4H PRN    ondansetron (ZOFRAN) injection 4 mg  4 mg IntraVENous Q4H PRN    oxyCODONE IR (ROXICODONE) tablet 5-10 mg  5-10 mg Oral Q4H PRN    HYDROmorphone (PF) (DILAUDID) injection 1 mg  1 mg IntraVENous Q4H PRN    naloxone (NARCAN) injection 0.4 mg  0.4 mg IntraVENous PRN         Review of Systems:negative    PE  Gen NAD  CV reg  Lungs clear  abd benign

## 2017-03-18 NOTE — PROGRESS NOTES
S: Patient doing extremely well. No pain. No calf pain or cramping. No cp or sob.     O:  Visit Vitals    /75 (BP 1 Location: Left arm, BP Patient Position: At rest)    Pulse 84    Temp 97.8 °F (36.6 °C)    Resp 16    Ht 5' 10\" (1.778 m)    Wt 66.6 kg (146 lb 13.2 oz)    SpO2 99%    BMI 21.07 kg/m2       RLE:  Splint removed  Incision dry and intact with no swelling or bleeding  +EHL/FHL  SILT dorsal/plantar foot    A/P: s/p ORIF R ankle fracture, hemophilia  - patient clear for DC by heme/onc today  - new splint placed  - Plan on follow up next week for suture removal

## 2019-11-20 NOTE — PROGRESS NOTES
November 20, 2019      [facility]  [address] [place] [address]  Phone: [phone]  Fax: [phone]       Patient: Deion Nielson   YOB: 2011  Date of Visit: 11/20/2019    To Whom It May Concern:    Claudy Nielson  was at [facility] on 11/20/2019. He may return to work/school on 11/25/2019 with no restrictions. If you have any questions or concerns, or if I can be of further assistance, please do not hesitate to contact me.    Sincerely,        Dilma Rivas NP      CLAUDIA spoke with Vivek Grigsby  with [facility] who stated she contacted the [facility] who stated they are not able to give the pt the requested med. CLAUDIA contacted Dr. Pedro Luis Flynn to inform him of the info and to inquire if he would like for the pt to do home infusion. Per Dr. Pedro Luis Flynn he would like for the pt to remain in the hospital until Friday once medication is complete due to the unknown timeframe of medication arriving to pt post discharge. CLAUDIA contacted Beatrice Womack and notified her of pt remaining in the hospital, the pharmacist Nilam Swift of pt remaining in the hospital to ensure pt will have meds, the Ortho NP, and the pt. CLAUDIA will continue to follow and assist with additional discharge needs.     Wai Enriquez, MSRITA  Ext [phone]

## 2024-07-11 NOTE — H&P
Orthopedic Admission History and Physical        NAME: Yasmeen Westfall       :  1955       MRN:  [id number]      Subjective:     Patient is a 57 y.o.  male who presents with history of trimalleolar ankle fracture with dislocation presented to our office after being treated at an Urgent Care center. Patient Active Problem List    Diagnosis Date Noted    Dislocation of right ankle joint 2017    Closed trimalleolar fracture of right ankle 2017     Past Medical History:   Diagnosis Date    [facility])       History reviewed. No pertinent surgical history. Prior to Admission medications    Not on File     No current facility-administered medications for this encounter. No Known Allergies   Social History   Substance Use Topics    Smoking status: Never Smoker    Smokeless tobacco: Not on file    Alcohol use No      History reviewed. No pertinent family history. Review of Systems  A comprehensive review of systems was negative except for that written in the HPI. Objective:     Patient Vitals for the past 8 hrs:   BP Temp Pulse Resp SpO2 Height Weight   17 1306 155/90 98.1 °F (36.7 °C) 94 16 97 % 5' 10\" (1.778 m) 64.9 kg (143 lb)     Temp (24hrs), Av.1 °F (36.7 °C), Min:98.1 °F (36.7 °C), Max:98.1 °F (36.7 °C)      Physical Exam:  General appearance: alert, cooperative, no distress, appears stated age  Lungs: clear to auscultation bilaterally  Heart: regular rate and rhythm, S1, S2 normal, no murmur, click, rub or gallop  Ankle:  Positive fracture blisters on the medial and lateral ankle. Diffuse swelling and bruising in the foot and ankle. No pain in the knee. Pulses are intact. Sensation is intact. Labs: No results found for this or any previous visit (from the past 24 hour(s)). Assessment:   No medical contraindications to proceeding with planned surgery.     Patient Active Problem List    Diagnosis Date Noted    Dislocation of right ankle joint 02/24/2017    Closed trimalleolar fracture of right ankle 02/24/2017         Plan:   Proceed with sherry  Pt. Stable  Pt. NPO x meds   Medical consultation for Pre-/post-operative medical care.   Anesthesia Consulted for Pre-Op Clearance  Pre-Op labs Ordered done

## (undated) DEVICE — REM POLYHESIVE ADULT PATIENT RETURN ELECTRODE: Brand: VALLEYLAB

## (undated) DEVICE — SPLINT MAT XF SPEC 5X30IN --

## (undated) DEVICE — Device

## (undated) DEVICE — SUTURE MCRYL SZ 4-0 L27IN ABSRB UD L19MM PS-2 1/2 CIR PRIM Y426H

## (undated) DEVICE — PADDING CAST SPEC 6INX4YD COT --

## (undated) DEVICE — GOWN,SIRUS,NONRNF,SETINSLV,XL,20/CS: Brand: MEDLINE

## (undated) DEVICE — INTENDED FOR TISSUE SEPARATION, AND OTHER PROCEDURES THAT REQUIRE A SHARP SURGICAL BLADE TO PUNCTURE OR CUT.: Brand: BARD-PARKER ® CARBON RIB-BACK BLADES

## (undated) DEVICE — SPLINT CAST PLASTER 3X15FT -- CONVERT TO ITEM 369010

## (undated) DEVICE — SUTURE VCRL SZ 3-0 L18IN ABSRB UD L26MM SH 1/2 CIR J864D

## (undated) DEVICE — Z CONVERTED USE 2107985 COVER FLROSCP W36XL28IN 4 SIDE ADH

## (undated) DEVICE — DRAPE,REIN 53X77,STERILE: Brand: MEDLINE

## (undated) DEVICE — PADDING CAST W6INXL4YD COT COHESIVE HND TEARABLE SPEC 100

## (undated) DEVICE — WIRE FIX K TRCR 1.25X150MM --

## (undated) DEVICE — STERILE POLYISOPRENE POWDER-FREE SURGICAL GLOVES: Brand: PROTEXIS

## (undated) DEVICE — GAUZE SPONGES,12 PLY: Brand: CURITY

## (undated) DEVICE — Z DISCONTINUED NO SUB IDED BUCKET CAST INF CLAV STRP WHT BCKL CLSR PREM DISPOSABLE

## (undated) DEVICE — PADDING CST 4INX4YD --

## (undated) DEVICE — DRESSING PETRO GZ XRFRM CURAD ST OVERWRAP 5 X 9 IN

## (undated) DEVICE — SUTURE PROL SZ 4-0 L18IN NONABSORBABLE BLU L16MM PC-3 3/8 8634G

## (undated) DEVICE — 3M™ IOBAN™ 2 ANTIMICROBIAL INCISE DRAPE 6650EZ: Brand: IOBAN™ 2

## (undated) DEVICE — ZIMMER® STERILE DISPOSABLE TOURNIQUET CUFF WITH PROTECTIVE SLEEVE AND PLC, DUAL PORT, SINGLE BLADDER, 34 IN. (86 CM)

## (undated) DEVICE — ROCKER SWITCH PENCIL BLADE ELECTRODE, HOLSTER: Brand: EDGE

## (undated) DEVICE — STOCKINETTE CAST W76XL2280CM POLY SYN FN KNIT RIB

## (undated) DEVICE — 3M™ COBAN™ NL STERILE NON-LATEX SELF-ADHERENT WRAP, 2084S, 4 IN X 5 YD (10 CM X 4,5 M), 18 ROLLS/CASE: Brand: 3M™ COBAN™

## (undated) DEVICE — BIT DRL L100MM DIA2.5MM FOR SM FRAG UNIV LOK SYS

## (undated) DEVICE — HANDLE LT SNAP ON ULT DURABLE LENS FOR TRUMPF ALC DISPOSABLE

## (undated) DEVICE — PADDING CAST 4 INX5 YD STRL

## (undated) DEVICE — COVER,MAYO STAND,STERILE: Brand: MEDLINE

## (undated) DEVICE — CURITY NON-ADHERENT STRIPS: Brand: CURITY

## (undated) DEVICE — OCCLUSIVE GAUZE STRIP,3% BISMUTH TRIBROMOPHENATE IN PETROLATUM BLEND: Brand: XEROFORM

## (undated) DEVICE — BIT DRL L100MM DIA2MM QUIK CONN W/O STP N RADLUC REUSE

## (undated) DEVICE — KENDALL SCD EXPRESS SLEEVES, KNEE LENGTH, MEDIUM: Brand: KENDALL SCD

## (undated) DEVICE — SUTURE ETHLN SZ 4-0 L18IN NONABSORBABLE BLK L19MM PS-2 3/8 1667H

## (undated) DEVICE — SOLUTION IV 1000ML 0.9% SOD CHL

## (undated) DEVICE — COVER,TABLE,60X90,STERILE: Brand: MEDLINE

## (undated) DEVICE — 1010 S-DRAPE TOWEL DRAPE 10/BX: Brand: STERI-DRAPE™

## (undated) DEVICE — 3M™ STERI-STRIP™ ELASTIC SKIN CLOSURES, E4547, 1/2 IN X 4 IN (12 MM X 100 MM), 6 STRIPS/ENVELOPE: Brand: 3M™ STERI-STRIP™

## (undated) DEVICE — SOLUTION IRRIG 1000ML H2O STRL BLT

## (undated) DEVICE — STERILE POLYISOPRENE POWDER-FREE SURGICAL GLOVES WITH EMOLLIENT COATING: Brand: PROTEXIS

## (undated) DEVICE — FRAZIER SUCTION INSTRUMENT 12 FR W/CONTROL VENT & OBTURATOR: Brand: FRAZIER

## (undated) DEVICE — C-ARMOR C-ARM EQUIPMENT COVERS CLEAR STERILE UNIVERSAL FIT 12 PER CASE: Brand: C-ARMOR

## (undated) DEVICE — SURGICAL PROCEDURE PACK BASIN MAJ SET CUST NO CAUT

## (undated) DEVICE — HOOK LOCK LATEX FREE ELASTIC BANDAGE D/L 6INX10YD

## (undated) DEVICE — KIT INFECTION CTRL ST FRAN --

## (undated) DEVICE — TRAY PREP DRY W/ PREM GLV 2 APPL 6 SPNG 2 UNDPD 1 OVERWRAP

## (undated) DEVICE — 3M™ STERI-STRIP™ REINFORCED ADHESIVE SKIN CLOSURES, R1546, 1/4 IN X 4 IN (6 MM X 100 MM), 10 STRIPS/ENVELOPE: Brand: 3M™ STERI-STRIP™

## (undated) DEVICE — DRAPE,U/ SHT,SPLIT,PLAS,STERIL: Brand: MEDLINE

## (undated) DEVICE — TOWEL SURG W17XL27IN STD BLU COT NONFENESTRATED PREWASHED